# Patient Record
Sex: FEMALE | Race: BLACK OR AFRICAN AMERICAN | HISPANIC OR LATINO | Employment: PART TIME | URBAN - METROPOLITAN AREA
[De-identification: names, ages, dates, MRNs, and addresses within clinical notes are randomized per-mention and may not be internally consistent; named-entity substitution may affect disease eponyms.]

---

## 2019-09-10 PROBLEM — E66.811 CLASS 1 OBESITY DUE TO EXCESS CALORIES WITH SERIOUS COMORBIDITY AND BODY MASS INDEX (BMI) OF 32.0 TO 32.9 IN ADULT: Status: ACTIVE | Noted: 2019-09-10

## 2023-05-30 PROBLEM — E66.811 CLASS 1 OBESITY DUE TO EXCESS CALORIES WITH SERIOUS COMORBIDITY AND BODY MASS INDEX (BMI) OF 34.0 TO 34.9 IN ADULT: Status: RESOLVED | Noted: 2019-09-10 | Resolved: 2023-05-30

## 2024-02-06 ENCOUNTER — TELEPHONE (OUTPATIENT)
Dept: OPHTHALMOLOGY | Facility: CLINIC | Age: 64
End: 2024-02-06
Payer: COMMERCIAL

## 2024-02-06 NOTE — TELEPHONE ENCOUNTER
Spoke to pt and r/s appt for feb 26th    ----- Message from Mare Whalen OD sent at 2/6/2024 12:04 PM CST -----  Contact: patient  Offer feb 28 or 29  ----- Message -----  From: Rk Valencia  Sent: 2/6/2024  11:57 AM CST  To: Koby WALTON Staff    Type:  Patient Call          Who Called: patient         Does the patient know what this is regarding?: Requesting a call back to have her missed appt rescheduled ;please advise           Would the patient rather a call back or a response via [facility]?call           Best Call Back Number:[phone]             Additional Information:

## 2024-02-26 ENCOUNTER — OFFICE VISIT (OUTPATIENT)
Dept: OPTOMETRY | Facility: CLINIC | Age: 64
End: 2024-02-26
Payer: COMMERCIAL

## 2024-02-26 DIAGNOSIS — H04.123 DRY EYE SYNDROME, BILATERAL: ICD-10-CM

## 2024-02-26 DIAGNOSIS — H25.13 NS (NUCLEAR SCLEROSIS), BILATERAL: ICD-10-CM

## 2024-02-26 DIAGNOSIS — H52.4 PRESBYOPIA: ICD-10-CM

## 2024-02-26 DIAGNOSIS — E11.9 TYPE 2 DIABETES MELLITUS WITHOUT COMPLICATION, WITHOUT LONG-TERM CURRENT USE OF INSULIN: Primary | ICD-10-CM

## 2024-02-26 DIAGNOSIS — E11.9 TYPE 2 DIABETES MELLITUS WITHOUT OPHTHALMIC MANIFESTATIONS: ICD-10-CM

## 2024-02-26 PROCEDURE — 1159F MED LIST DOCD IN RCRD: CPT | Mod: CPTII,S$GLB,, | Performed by: OPTOMETRIST

## 2024-02-26 PROCEDURE — 92015 DETERMINE REFRACTIVE STATE: CPT | Mod: S$GLB,,, | Performed by: OPTOMETRIST

## 2024-02-26 PROCEDURE — 99204 OFFICE O/P NEW MOD 45 MIN: CPT | Mod: S$GLB,,, | Performed by: OPTOMETRIST

## 2024-02-26 PROCEDURE — 1160F RVW MEDS BY RX/DR IN RCRD: CPT | Mod: CPTII,S$GLB,, | Performed by: OPTOMETRIST

## 2024-02-26 PROCEDURE — 99999 PR PBB SHADOW E&M-EST. PATIENT-LVL III: CPT | Mod: PBBFAC,,, | Performed by: OPTOMETRIST

## 2024-02-26 PROCEDURE — 2023F DILAT RTA XM W/O RTNOPTHY: CPT | Mod: CPTII,S$GLB,, | Performed by: OPTOMETRIST

## 2024-02-26 RX ORDER — CYCLOSPORINE 0.5 MG/ML
1 EMULSION OPHTHALMIC 2 TIMES DAILY
Qty: 180 EACH | Refills: 3 | Status: SHIPPED | OUTPATIENT
Start: 2024-02-26 | End: 2025-02-25

## 2024-02-29 NOTE — PROGRESS NOTES
HPI    KARY: 4yrs    CC: Pt is here today for a routine eye exam. Pt states that her near and   distance vision is blurry. Pt states that her vision fluctuates.     (-)Dryness  (-)Burning  (+)Itchiness  (+)Tearing  (-)Flashes  (+)Floaters   (+)Photophobia  (-)Eye Pain  (+) Migraines       Diabetic: yes  A1C: 5.4 on 9/19/2023    Contact Lens: no    Eye Meds: no  Last edited by Samira Quijano MA on 2/26/2024  3:38 PM.            Assessment /Plan     For exam results, see Encounter Report.          Type 2 diabetes mellitus without complication, without long-term current use of insulin  Type 2 diabetes mellitus without ophthalmic manifestations  Hypertension, unspecified type              No retinopathy, monitor yearly       NS (nuclear sclerosis), bilateral              Mild, monitor yearly          Dry eye syndrome, bilateral  -     cycloSPORINE (RESTASIS) 0.05 % ophthalmic emulsion; Place 1 drop into both eyes 2 (two) times daily.  Dispense: 180 each; Refill: 12  -         Presbyopia    Rx specs, discussed trifocal vs computer reading specs    RTC 1 year, sooner PRN

## 2024-03-08 ENCOUNTER — OFFICE VISIT (OUTPATIENT)
Dept: ALLERGY | Facility: CLINIC | Age: 64
End: 2024-03-08
Payer: COMMERCIAL

## 2024-03-08 VITALS — WEIGHT: 184.5 LBS | HEIGHT: 66 IN | BODY MASS INDEX: 29.65 KG/M2

## 2024-03-08 DIAGNOSIS — H10.13 ALLERGIC CONJUNCTIVITIS, BILATERAL: Primary | ICD-10-CM

## 2024-03-08 DIAGNOSIS — J45.20 MILD INTERMITTENT ASTHMA WITHOUT COMPLICATION: ICD-10-CM

## 2024-03-08 DIAGNOSIS — J30.9 CHRONIC ALLERGIC RHINITIS: ICD-10-CM

## 2024-03-08 DIAGNOSIS — L29.9 PRURITUS: ICD-10-CM

## 2024-03-08 DIAGNOSIS — T78.3XXD ANGIOEDEMA, SUBSEQUENT ENCOUNTER: ICD-10-CM

## 2024-03-08 PROCEDURE — 1159F MED LIST DOCD IN RCRD: CPT | Mod: CPTII,S$GLB,, | Performed by: ALLERGY & IMMUNOLOGY

## 2024-03-08 PROCEDURE — 99215 OFFICE O/P EST HI 40 MIN: CPT | Mod: S$GLB,,, | Performed by: ALLERGY & IMMUNOLOGY

## 2024-03-08 PROCEDURE — 99999 PR PBB SHADOW E&M-EST. PATIENT-LVL III: CPT | Mod: PBBFAC,,, | Performed by: ALLERGY & IMMUNOLOGY

## 2024-03-08 PROCEDURE — 3008F BODY MASS INDEX DOCD: CPT | Mod: CPTII,S$GLB,, | Performed by: ALLERGY & IMMUNOLOGY

## 2024-03-08 PROCEDURE — 1160F RVW MEDS BY RX/DR IN RCRD: CPT | Mod: CPTII,S$GLB,, | Performed by: ALLERGY & IMMUNOLOGY

## 2024-03-08 RX ORDER — MONTELUKAST SODIUM 10 MG/1
10 TABLET ORAL NIGHTLY
Qty: 30 TABLET | Refills: 12 | Status: SHIPPED | OUTPATIENT
Start: 2024-03-08 | End: 2024-04-07

## 2024-03-08 RX ORDER — ONDANSETRON 4 MG/1
4 TABLET, ORALLY DISINTEGRATING ORAL
COMMUNITY
Start: 2023-11-14

## 2024-03-08 NOTE — PROGRESS NOTES
Subjective:       Patient ID: Meka Medina is a 63 y.o. female.    Chief Complaint:  Follow-up and Asthma (Chronic allergic rhinitis)      64 yo woman presents for continued evaluation of allergic rhinitis and conjunctivitis, asthma and face swelling and itching. She was last seen 7/22/2021. She had labs with positive immunocaps to cat, cockroach, grass, trees and weeds. Also had low positive to peanut but rest negative. had normal C1 esterase, thyroid and SPEP but IgE receptor antibody not back. She has had no more swelling.   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 Strong scents are triggering HA< nausea and SOB. She has had to stay off work because of this. When home she is fine, is only triggered around strong scents. She has paper work to request fragrance free. She is taking azelastine 2 SEN BID and albuterol about 3 ties per day. She does have runny nose but not much sneeze. Has some congestion. More tight chest and SOB. She does get some itchy skin as well at times.   .     Prior History taken 1/22/2021: new patient evaluation of face swelling. She states for at least 6 months she gets eye, nose and lip swelling. Off and on. No SOB. The skin will feel tight but no hives or itch. She also has rhinitis with congestion.PND, itchy nose and skin. Seems worse with scents like form nail salon, dog, curtains, pine pollen. Also has diarrhea with tomato products and rash wit latex and banana so avoids those. She takes benadryl prn for swelling. Tried xyzal but made too sleepy. She has asthma but rarely uses albuterol. No eczema. Has HTN and DM        Environmental History: see history section for home environment  Review of Systems   Constitutional:  Negative for appetite change, chills, fatigue and fever.   HENT:  Positive for congestion, postnasal drip and rhinorrhea. Negative for ear discharge, ear pain, facial swelling, nosebleeds, sinus pressure, sneezing, sore throat, trouble  swallowing and voice change.    Eyes:  Negative for discharge, redness, itching and visual disturbance.   Respiratory:  Positive for cough, chest tightness and shortness of breath. Negative for choking and wheezing.    Cardiovascular:  Negative for chest pain, palpitations and leg swelling.   Gastrointestinal:  Negative for abdominal distention, abdominal pain, constipation, diarrhea, nausea and vomiting.   Genitourinary:  Negative for difficulty urinating.   Musculoskeletal:  Negative for arthralgias, gait problem, joint swelling and myalgias.   Skin:  Negative for color change and rash.   Neurological:  Positive for headaches. Negative for dizziness, syncope, weakness and light-headedness.   Hematological:  Negative for adenopathy. Does not bruise/bleed easily.   Psychiatric/Behavioral:  Negative for agitation, behavioral problems, confusion and sleep disturbance. The patient is not nervous/anxious.         Objective:      Physical Exam  Vitals and nursing note reviewed.   Constitutional:       General: She is not in acute distress.     Appearance: Normal appearance. She is not ill-appearing.   HENT:      Nose: No rhinorrhea.   Eyes:      General:         Right eye: No discharge.         Left eye: No discharge.      Conjunctiva/sclera: Conjunctivae normal.   Pulmonary:      Effort: Pulmonary effort is normal. No respiratory distress.   Abdominal:      General: There is no distension.   Skin:     General: Skin is warm and dry.      Findings: No erythema or rash.   Neurological:      Mental Status: She is alert and oriented to person, place, and time.   Psychiatric:         Mood and Affect: Mood normal.         Behavior: Behavior normal.         Laboratory:   none performed   Assessment:       1. Allergic conjunctivitis, bilateral    2. Mild intermittent asthma without complication    3. Chronic allergic rhinitis    4. Angioedema, subsequent encounter    5. Pruritus         Plan:       1. Pt with RAD/asthma and is  triggered by scents, viruses, allergens. Discussed in detail with pt and forms filled out for work to have fragrance free environment  2. Continue azelastine 2 SEN BID and add montelukast 10 mg daily  3. Albuterol 2 puff every 4 hours, advised ideal is to use less then 2 times per week so if not better with montelukast then may need ICS  4. Cat avoidance  5. She does not eat peanuts so not cause of face swelling. She will continue to avoid as does not like  6. RTC 6 months or sooner if not controlled    I spent a total of 40 minutes on the day of the visit.  This includes face to face time and non-face to face time preparing to see the patient (eg, review of tests), obtaining and/or reviewing separately obtained history, documenting clinical information in the electronic or other health record, independently interpreting results and communicating results to the patient/family/caregiver, or care coordinator.

## 2024-03-15 ENCOUNTER — TELEPHONE (OUTPATIENT)
Dept: ALLERGY | Facility: CLINIC | Age: 64
End: 2024-03-15
Payer: COMMERCIAL

## 2024-03-15 NOTE — TELEPHONE ENCOUNTER
----- Message from Anisa Moya MD sent at 3/15/2024  8:48 AM CDT -----  Contact: [phone]  Please advise pt I am in clinic and cannot make phone calls,. She can send message through protal or give information to be relayed here. If she has paperwork can schedule a follow up to be filled out Or drop off in clinic but need 72 hours to complete  ----- Message -----  From: Neyda Layton MA  Sent: 3/15/2024   8:34 AM CDT  To: Anisa Moya MD      ----- Message -----  From: Joselyn Kothari  Sent: 3/15/2024   8:03 AM CDT  To: [facility] Staff    Patient called, requested a courtesy call from Dr Moya in regards rx montelukast (SINGULAIR) 10 mg tablet in regards side effects and paper work that she needs to bring for doctor to fill it out. Please call and advise. Thank you.

## 2024-03-21 ENCOUNTER — TELEPHONE (OUTPATIENT)
Dept: ALLERGY | Facility: CLINIC | Age: 64
End: 2024-03-21
Payer: COMMERCIAL

## 2024-03-21 NOTE — TELEPHONE ENCOUNTER
Spoke with patient in regards to below message. She verbalized she does not want to decrease the dose of singulair. And she is feeling much better.      Singulair does not cause sedation so her symptoms may be related to something else but we can decrease to the children's dose of 5 mg but that low dose may not be effective

## 2024-03-21 NOTE — TELEPHONE ENCOUNTER
----- Message from Anisa Moya MD sent at 3/21/2024 10:45 AM CDT -----  Singulair does not cause sedation so her symptoms may be related to something else but we can decrease to the children's dose of 5 mg but that low dose may not be effective  ----- Message -----  From: Donna Price MA  Sent: 3/21/2024  10:13 AM CDT  To: Anisa Moya MD    Please advise    Spoke to patient in regards to below message. Patient verbalized she is feeling drowsy an not able to function on the 10mg dose of Singulair is it a lesser dose she can take to be able to do ADL.       ----- Message -----  From: Maile Sanchez  Sent: 3/21/2024  10:01 AM CDT  To: [facility] Staff    Type:  Needs Medical Advice    Who Called: pt   Best Call Back Number: [phone] (home)     Additional Information:  Requesting call back  wants to know should she have a f/u or a change of rx     please advise thank you

## 2024-06-12 ENCOUNTER — TELEPHONE (OUTPATIENT)
Dept: ALLERGY | Facility: CLINIC | Age: 64
End: 2024-06-12
Payer: COMMERCIAL

## 2024-06-12 NOTE — TELEPHONE ENCOUNTER
Spoke with MsLorin Carol concerning the steroid challenge. Ms. Vicente states that she will be  having a medical procedure done that will consist of steroids being injected into her spine. She also states that years ago she was given steroids  which cause her to be hospitalized for muscle weakness and feeling jittery. She would like to get an allergy test for steroids.

## 2024-06-13 NOTE — TELEPHONE ENCOUNTER
Spoke with patient today  and informed her of message below. Patient request for appointment to get educated on steroid side effects. Appointment has been scheduled for June 21 st with Dr. Moya.                6/13/24  7:59 AM  The symptoms she is describing are side effects from steroid and not allergy symptoms so testing does not sound warranted. She can make an appointment to review the story of reaction and advise her more  June 12, 2024  Me   to Anisa Moya MD   CG      6/12/24  4:58 PM  Spoke with Ms. Medina concerning the steroid challenge. Ms. Medina states that she will be  having a medical procedure done that will consist of steroids being injected into her spine. She also states that years ago she was given steroids  which cause her to be hospitalized for muscle weakness and feeling jittery. She would like to get an allergy test for steroids.

## 2024-06-21 ENCOUNTER — OFFICE VISIT (OUTPATIENT)
Dept: ALLERGY | Facility: CLINIC | Age: 64
End: 2024-06-21
Payer: COMMERCIAL

## 2024-06-21 VITALS — WEIGHT: 195.56 LBS | BODY MASS INDEX: 31.43 KG/M2 | HEIGHT: 66 IN

## 2024-06-21 DIAGNOSIS — L29.9 PRURITUS: ICD-10-CM

## 2024-06-21 DIAGNOSIS — H10.13 ALLERGIC CONJUNCTIVITIS, BILATERAL: Primary | ICD-10-CM

## 2024-06-21 DIAGNOSIS — Z88.9 DRUG ALLERGY: ICD-10-CM

## 2024-06-21 DIAGNOSIS — J30.9 CHRONIC ALLERGIC RHINITIS: ICD-10-CM

## 2024-06-21 DIAGNOSIS — J45.20 MILD INTERMITTENT ASTHMA WITHOUT COMPLICATION: ICD-10-CM

## 2024-06-21 DIAGNOSIS — T78.3XXD ANGIOEDEMA, SUBSEQUENT ENCOUNTER: ICD-10-CM

## 2024-06-21 PROCEDURE — 99999 PR PBB SHADOW E&M-EST. PATIENT-LVL III: CPT | Mod: PBBFAC,,, | Performed by: ALLERGY & IMMUNOLOGY

## 2024-06-21 NOTE — PROGRESS NOTES
Subjective:       Patient ID: Meka Medina is a 63 y.o. female.    Chief Complaint:  No chief complaint on file.      64 yo woman presents for continued evaluation of allergic rhinitis and conjunctivitis, asthma and face swelling and itching. She was last seen 3/8/2024. She had labs with positive immunocaps to cat, cockroach, grass, trees and weeds. Also had low positive to peanut but rest negative. had normal C1 esterase, thyroid and SPEP but IgE receptor antibody not back. She has had no more swelling.   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 Strong scents are triggering HA, nausea and SOB. She has had to stay off work because of this. When home she is fine, is only triggered around strong scents. She has paper work to request fragrance free. This was done and she did much better at end of school year and is going to different class next year. She is taking azelastine 2 SEN BID and albuterol prn. She does have runny nose but not much sneeze. Has some congestion.   She has H/O reaction to steroid in past. Was on oral steroid and had legs collapse on her. Ended up in hospital for weakness. No rash, no swelling, no SOB. She has had small amount of prednisone for few days in past but other times prescribed and did not take.   .     Prior History taken 1/22/2021: new patient evaluation of face swelling. She states for at least 6 months she gets eye, nose and lip swelling. Off and on. No SOB. The skin will feel tight but no hives or itch. She also has rhinitis with congestion.PND, itchy nose and skin. Seems worse with scents like form nail salon, dog, curtains, pine pollen. Also has diarrhea with tomato products and rash wit latex and banana so avoids those. She takes benadryl prn for swelling. Tried xyzal but made too sleepy. She has asthma but rarely uses albuterol. No eczema. Has HTN and DM        Environmental History: see history section for home environment  Review of Systems    Constitutional:  Negative for appetite change, chills, fatigue and fever.   HENT:  Positive for congestion, postnasal drip and rhinorrhea. Negative for ear discharge, ear pain, facial swelling, nosebleeds, sinus pressure, sneezing, sore throat, trouble swallowing and voice change.    Eyes:  Negative for discharge, redness, itching and visual disturbance.   Respiratory:  Positive for cough, chest tightness and shortness of breath. Negative for choking and wheezing.    Cardiovascular:  Negative for chest pain, palpitations and leg swelling.   Gastrointestinal:  Negative for abdominal distention, abdominal pain, constipation, diarrhea, nausea and vomiting.   Genitourinary:  Negative for difficulty urinating.   Musculoskeletal:  Negative for arthralgias, gait problem, joint swelling and myalgias.   Skin:  Negative for color change and rash.   Neurological:  Positive for headaches. Negative for dizziness, syncope, weakness and light-headedness.   Hematological:  Negative for adenopathy. Does not bruise/bleed easily.   Psychiatric/Behavioral:  Negative for agitation, behavioral problems, confusion and sleep disturbance. The patient is not nervous/anxious.         Objective:      Physical Exam  Vitals and nursing note reviewed.   Constitutional:       General: She is not in acute distress.     Appearance: Normal appearance. She is not ill-appearing.   HENT:      Nose: No rhinorrhea.   Eyes:      General:         Right eye: No discharge.         Left eye: No discharge.      Conjunctiva/sclera: Conjunctivae normal.   Pulmonary:      Effort: Pulmonary effort is normal. No respiratory distress.   Abdominal:      General: There is no distension.   Skin:     General: Skin is warm and dry.      Findings: No erythema or rash.   Neurological:      Mental Status: She is alert and oriented to person, place, and time.   Psychiatric:         Mood and Affect: Mood normal.         Behavior: Behavior normal.       Laboratory:   none  performed   Assessment:       1. Allergic conjunctivitis, bilateral    2. Mild intermittent asthma without complication    3. Chronic allergic rhinitis    4. Angioedema, subsequent encounter    5. Pruritus         Plan:       1. Pt with RAD/asthma and is triggered by scents, viruses, allergens. Discussed in detail with pt and forms filled out for work to have fragrance free environment  2. Continue azelastine 2 SEN BID and add montelukast 10 mg daily  3. Albuterol 2 puff every 4 hours, advised ideal is to use less then 2 times per week so if not better with montelukast then may need ICS  4. Cat avoidance  5. She does not eat peanuts so not cause of face swelling. She will continue to avoid as does not like  6. Advised weakness with steroid is side effect and not allergy. There is not esting that can be done. Given plan is steroid through epidural less chance of systemic side effects wit epidural, advised pt to discuss with kyrie who will do epidiral  7. RTC 6 months or sooner if not controlled    I spent a total of 40 minutes on the day of the visit.  This includes face to face time and non-face to face time preparing to see the patient (eg, review of tests), obtaining and/or reviewing separately obtained history, documenting clinical information in the electronic or other health record, independently interpreting results and communicating results to the patient/family/caregiver, or care coordinator.

## 2024-09-16 ENCOUNTER — OFFICE VISIT (OUTPATIENT)
Dept: URGENT CARE | Facility: CLINIC | Age: 64
End: 2024-09-16
Payer: COMMERCIAL

## 2024-09-16 VITALS
OXYGEN SATURATION: 99 % | HEIGHT: 66 IN | BODY MASS INDEX: 31.43 KG/M2 | RESPIRATION RATE: 15 BRPM | DIASTOLIC BLOOD PRESSURE: 78 MMHG | TEMPERATURE: 98 F | WEIGHT: 195.56 LBS | HEART RATE: 72 BPM | SYSTOLIC BLOOD PRESSURE: 135 MMHG

## 2024-09-16 DIAGNOSIS — R53.83 FATIGUE, UNSPECIFIED TYPE: ICD-10-CM

## 2024-09-16 DIAGNOSIS — R06.02 SHORTNESS OF BREATH: Primary | ICD-10-CM

## 2024-09-16 DIAGNOSIS — F41.9 ANXIETY: ICD-10-CM

## 2024-09-16 DIAGNOSIS — R07.89 CHEST DISCOMFORT: ICD-10-CM

## 2024-09-16 LAB
BILIRUBIN, UA POC OHS: NEGATIVE
BLOOD, UA POC OHS: NEGATIVE
CLARITY, UA POC OHS: CLEAR
COLOR, UA POC OHS: YELLOW
CTP QC/QA: YES
GLUCOSE, UA POC OHS: NEGATIVE
KETONES, UA POC OHS: NEGATIVE
LEUKOCYTES, UA POC OHS: NEGATIVE
NITRITE, UA POC OHS: NEGATIVE
PH, UA POC OHS: 5.5
PROTEIN, UA POC OHS: NEGATIVE
SARS-COV-2 AG RESP QL IA.RAPID: NEGATIVE
SPECIFIC GRAVITY, UA POC OHS: 1.02
UROBILINOGEN, UA POC OHS: 0.2

## 2024-09-16 PROCEDURE — 81003 URINALYSIS AUTO W/O SCOPE: CPT | Mod: QW,S$GLB,,

## 2024-09-16 PROCEDURE — 71046 X-RAY EXAM CHEST 2 VIEWS: CPT | Mod: S$GLB,,, | Performed by: RADIOLOGY

## 2024-09-16 PROCEDURE — 93010 ELECTROCARDIOGRAM REPORT: CPT | Mod: S$GLB,,, | Performed by: INTERNAL MEDICINE

## 2024-09-16 PROCEDURE — 99214 OFFICE O/P EST MOD 30 MIN: CPT | Mod: S$GLB,,,

## 2024-09-16 PROCEDURE — 93005 ELECTROCARDIOGRAM TRACING: CPT | Mod: S$GLB,,,

## 2024-09-16 PROCEDURE — 87811 SARS-COV-2 COVID19 W/OPTIC: CPT | Mod: QW,S$GLB,,

## 2024-09-16 NOTE — PROGRESS NOTES
"Subjective:      Patient ID: Meka Medina is a 63 y.o. female.    Vitals:  height is 5' 6" (1.676 m) and weight is 88.7 kg (195 lb 8.8 oz). Her oral temperature is 98.3 °F (36.8 °C). Her blood pressure is 135/78 and her pulse is 72. Her respiration is 15 and oxygen saturation is 99%.     Chief Complaint: Breathing Problem    63 year old female c/o rapid breathing. Pt states Friday she had an procedure done and an (epidural). Pt states this morning she wasn't feeling good. Pt states weakness, fatigue, drowsy, and burning up(but no fever). Pt states she doesn't feel like herself, anxiety and anxious. Pt states shortness of breath and chest tightness. Pt states she had to stop walking to gasp for air. Pt states she used the albuterol inhaler this morning.  Pt states headaches. She wonders if the anesthesia have something to do with it. Pt states the trouble swallowing subsided this morning. Pt states that she feel very foggy.     Breathing Problem  She complains of chest tightness, difficulty breathing, hoarse voice and shortness of breath. There is no cough or wheezing. This is a new problem. The current episode started in the past 7 days. The problem occurs constantly. The problem has been gradually worsening. Associated symptoms include dyspnea on exertion, headaches and trouble swallowing. Pertinent negatives include no chest pain, fever, myalgias or nasal congestion. Her symptoms are aggravated by any activity.       Constitution: Negative for fever.   HENT:  Positive for trouble swallowing.    Cardiovascular:  Positive for sob on exertion. Negative for chest pain.   Respiratory:  Positive for shortness of breath. Negative for cough and wheezing.    Gastrointestinal:  Negative for abdominal pain, nausea, vomiting and diarrhea.   Musculoskeletal:  Negative for muscle ache.   Neurological:  Positive for headaches.      Objective:     Physical Exam   Constitutional: She is oriented to person, place, and time. She " appears well-developed. She is cooperative.  Non-toxic appearance. She does not appear ill. No distress.   HENT:   Head: Normocephalic and atraumatic.   Ears:   Right Ear: Hearing, tympanic membrane, external ear and ear canal normal.   Left Ear: Hearing, tympanic membrane, external ear and ear canal normal.   Nose: Nose normal. No mucosal edema, rhinorrhea or nasal deformity. No epistaxis. Right sinus exhibits no maxillary sinus tenderness and no frontal sinus tenderness. Left sinus exhibits no maxillary sinus tenderness and no frontal sinus tenderness.   Mouth/Throat: Uvula is midline, oropharynx is clear and moist and mucous membranes are normal. No trismus in the jaw. Normal dentition. No uvula swelling. No posterior oropharyngeal erythema.   Eyes: Conjunctivae and lids are normal. Right eye exhibits no discharge. Left eye exhibits no discharge. No scleral icterus.   Neck: Trachea normal and phonation normal. Neck supple.   Cardiovascular: Normal rate, regular rhythm, normal heart sounds and normal pulses.   Pulmonary/Chest: Effort normal and breath sounds normal. No stridor. No respiratory distress. She has no wheezes. She has no rhonchi. She has no rales.   Abdominal: Normal appearance and bowel sounds are normal. She exhibits no distension and no mass. Soft. There is no abdominal tenderness.   Musculoskeletal: Normal range of motion.         General: No deformity. Normal range of motion.   Neurological: She is alert and oriented to person, place, and time. She exhibits normal muscle tone. Coordination normal.   Skin: Skin is warm, dry, intact, not diaphoretic and not pale.   Psychiatric: Her speech is normal and behavior is normal. Judgment and thought content normal.   Nursing note and vitals reviewed.      Assessment:     1. Shortness of breath    2. Chest discomfort    3. Fatigue, unspecified type    4. Anxiety        Plan:     Results for orders placed or performed in visit on 09/16/24   SARS Coronavirus  2 Antigen, POCT Manual Read   Result Value Ref Range    SARS Coronavirus 2 Antigen Negative Negative     Acceptable Yes    POCT Urinalysis(Instrument)   Result Value Ref Range    Color, POC UA Yellow Yellow, Straw, Colorless    Clarity, POC UA Clear Clear    Glucose, POC UA Negative Negative    Bilirubin, POC UA Negative Negative    Ketones, POC UA Negative Negative    Spec Grav POC UA 1.020 1.005 - 1.030    Blood, POC UA Negative Negative    pH, POC UA 5.5 5.0 - 8.0    Protein, POC UA Negative Negative    Urobilinogen, POC UA 0.2 <=1.0    Nitrite, POC UA Negative Negative    WBC, POC UA Negative Negative     EXAMINATION:  XR CHEST PA AND LATERAL     CLINICAL HISTORY:  Shortness of breath     TECHNIQUE:  PA and lateral views of the chest were performed.     COMPARISON:  09/19/2023     FINDINGS:  The cardiomediastinal silhouette is not enlarged, magnified by technique noting calcification of the aorta..  There is no pleural effusion.  The trachea is midline.  The lungs are symmetrically expanded bilaterally without evidence of acute parenchymal process. No large focal consolidation seen.  There is no pneumothorax.  The osseous structures are remarkable for degenerative change..     Impression:     1. No acute cardiopulmonary process.        Electronically signed by:Delvin Rubio MD  Date:                                            09/16/2024  Time:                                           16:47      Shortness of breath  -     XR CHEST PA AND LATERAL; Future; Expected date: 09/16/2024    Chest discomfort  -     IN OFFICE EKG 12-LEAD (to Muse)    Fatigue, unspecified type  -     SARS Coronavirus 2 Antigen, POCT Manual Read  -     POCT Urinalysis(Instrument)    Anxiety      Medical Decision Making:   Differential Diagnosis:   Pneumonia, viral URI, anxiety, PE, UTI, ACS, adverse effect of anesthesia   Urgent Care Management:  62 y/o female here with c/o SOB, chest discomfort, anxiety, headache, fatigue,  subjective fevers, sore throat, foot pain. Tried her albuterol inhaler without relief. She had L4 & L5 epidural procedure before symptoms began on 9/13/2024. Did not receive full anesthesia. She tried to contact the surgeon but waiting on them to call her back. She has a lot of anxiety about going back to her job and would like an excuse. She's having trouble sleeping at night. She is upset because she didn't want to have this procedure done but did it so she can continue to work. EKG NSR, no ectopy or ST abnormality. Chest x-ray negative. UA negative. COVID negative. Walking saturation 98-99% with HR 72 bpm. Advised to rest at home. Increase water intake. Manage stress levels. Low sodium diet. Tylenol for pain. Call PCP and surgeon for recheck in a few days. Strict ED precautions if symptoms worsen.     30 minutes spent on patient's encounter. This includes face to face time and non-face to face time preparing to see the patient (eg, review of tests), obtaining and/or reviewing separately obtained history, documenting clinical information in the electronic or other health record, independently interpreting results and communicating results to the patient/family/caregiver, or care coordinator.    Discussed results/diagnosis/plan with patient in clinic. Strict precautions given to patient to monitor for worsening signs and symptoms. Advised to follow up with PCP or specialist.  Explained side effects of medications prescribed with patient and informed him/her to discontinue use if he/she has any side effects and to inform UC or PCP if this occurs. All questions answered. Strict ED verses clinic return precautions stressed and given in depth. Advised if symptoms worsens of fail to improve he/she should go to the Emergency Room. Discharge and follow-up instructions given verbally/printed with the patient who expressed understanding and willingness to comply with my recommendations. Patient voiced understanding and in  agreement with current treatment plan. Patient exits the exam room in no acute distress. Conversant and engaged during discharge discussion, verbalized understanding.

## 2024-09-16 NOTE — LETTER
September 16, 2024      [facility] [address] [address]  Phone: [phone]  Fax: [phone]       Patient: Meka Medina   YOB: 1960  Date of Visit: 09/16/2024    To Whom It May Concern:    Isaac Medina  was at [facility] on 09/16/2024. The patient may return to work/school on 9/18/2024 or 9/19/2024 with no restrictions. If you have any questions or concerns, or if I can be of further assistance, please do not hesitate to contact me.    Sincerely,    Ester Bonilla, NP

## 2024-09-16 NOTE — PATIENT INSTRUCTIONS
Drink plenty of water. The recommended daily fluid intake for women is 2.7 liters (five 16 oz bottles).      Rest at home. Get good rest at night. Eat nutritious foods. Low sodium diet. Take tylenol for pain.     Call your surgeon for recheck in a few days.     Go to the ER if you have chest pain, shortness of breath, palpitations, worsening anxiety, abdominal pain, vomiting and unable to tolerate fluids.

## 2024-09-17 ENCOUNTER — TELEPHONE (OUTPATIENT)
Dept: URGENT CARE | Facility: CLINIC | Age: 64
End: 2024-09-17
Payer: COMMERCIAL

## 2024-09-17 ENCOUNTER — HOSPITAL ENCOUNTER (EMERGENCY)
Facility: HOSPITAL | Age: 64
Discharge: HOME | End: 2024-09-17
Attending: EMERGENCY MEDICINE
Payer: COMMERCIAL

## 2024-09-17 VITALS
SYSTOLIC BLOOD PRESSURE: 165 MMHG | HEIGHT: 66 IN | HEART RATE: 65 BPM | RESPIRATION RATE: 16 BRPM | DIASTOLIC BLOOD PRESSURE: 92 MMHG | BODY MASS INDEX: 30.53 KG/M2 | TEMPERATURE: 99 F | OXYGEN SATURATION: 95 % | WEIGHT: 190 LBS

## 2024-09-17 DIAGNOSIS — R94.31 ABNORMAL EKG: Primary | ICD-10-CM

## 2024-09-17 DIAGNOSIS — R68.89 MULTIPLE COMPLAINTS: ICD-10-CM

## 2024-09-17 DIAGNOSIS — R07.9 CHEST PAIN: ICD-10-CM

## 2024-09-17 LAB
ALBUMIN SERPL BCP-MCNC: 3.6 G/DL (ref 3.5–5.2)
ALP SERPL-CCNC: 122 U/L (ref 55–135)
ALT SERPL W/O P-5'-P-CCNC: 16 U/L (ref 10–44)
ANION GAP SERPL CALC-SCNC: 9 MMOL/L (ref 8–16)
AST SERPL-CCNC: 16 U/L (ref 10–40)
BASOPHILS # BLD AUTO: 0.04 K/UL (ref 0–0.2)
BASOPHILS NFR BLD: 0.4 % (ref 0–1.9)
BILIRUB SERPL-MCNC: 0.3 MG/DL (ref 0.1–1)
BUN SERPL-MCNC: 20 MG/DL (ref 8–23)
CALCIUM SERPL-MCNC: 9.5 MG/DL (ref 8.7–10.5)
CHLORIDE SERPL-SCNC: 106 MMOL/L (ref 95–110)
CO2 SERPL-SCNC: 29 MMOL/L (ref 23–29)
CREAT SERPL-MCNC: 0.8 MG/DL (ref 0.5–1.4)
DIFFERENTIAL METHOD BLD: ABNORMAL
EOSINOPHIL # BLD AUTO: 0.1 K/UL (ref 0–0.5)
EOSINOPHIL NFR BLD: 0.6 % (ref 0–8)
ERYTHROCYTE [DISTWIDTH] IN BLOOD BY AUTOMATED COUNT: 18.6 % (ref 11.5–14.5)
EST. GFR  (NO RACE VARIABLE): >60 ML/MIN/1.73 M^2
GLUCOSE SERPL-MCNC: 104 MG/DL (ref 70–110)
HCT VFR BLD AUTO: 43.2 % (ref 37–48.5)
HCV AB SERPL QL IA: NORMAL
HGB BLD-MCNC: 12.4 G/DL (ref 12–16)
HIV 1+2 AB+HIV1 P24 AG SERPL QL IA: NORMAL
IMM GRANULOCYTES # BLD AUTO: 0.02 K/UL (ref 0–0.04)
IMM GRANULOCYTES NFR BLD AUTO: 0.2 % (ref 0–0.5)
LYMPHOCYTES # BLD AUTO: 2.7 K/UL (ref 1–4.8)
LYMPHOCYTES NFR BLD: 27.6 % (ref 18–48)
MCH RBC QN AUTO: 21.3 PG (ref 27–31)
MCHC RBC AUTO-ENTMCNC: 28.7 G/DL (ref 32–36)
MCV RBC AUTO: 74 FL (ref 82–98)
MONOCYTES # BLD AUTO: 0.5 K/UL (ref 0.3–1)
MONOCYTES NFR BLD: 5.2 % (ref 4–15)
NEUTROPHILS # BLD AUTO: 6.5 K/UL (ref 1.8–7.7)
NEUTROPHILS NFR BLD: 66 % (ref 38–73)
NRBC BLD-RTO: 0 /100 WBC
OHS QRS DURATION: 88 MS
OHS QRS DURATION: 94 MS
OHS QTC CALCULATION: 408 MS
OHS QTC CALCULATION: 424 MS
PLATELET # BLD AUTO: 288 K/UL (ref 150–450)
PMV BLD AUTO: 9 FL (ref 9.2–12.9)
POTASSIUM SERPL-SCNC: 3.9 MMOL/L (ref 3.5–5.1)
PROT SERPL-MCNC: 7.1 G/DL (ref 6–8.4)
RBC # BLD AUTO: 5.81 M/UL (ref 4–5.4)
SODIUM SERPL-SCNC: 144 MMOL/L (ref 136–145)
TROPONIN I SERPL DL<=0.01 NG/ML-MCNC: <0.006 NG/ML (ref 0–0.03)
TROPONIN I SERPL DL<=0.01 NG/ML-MCNC: <0.006 NG/ML (ref 0–0.03)
WBC # BLD AUTO: 9.8 K/UL (ref 3.9–12.7)

## 2024-09-17 PROCEDURE — 84484 ASSAY OF TROPONIN QUANT: CPT | Performed by: PHYSICIAN ASSISTANT

## 2024-09-17 PROCEDURE — 93010 ELECTROCARDIOGRAM REPORT: CPT | Mod: ,,, | Performed by: INTERNAL MEDICINE

## 2024-09-17 PROCEDURE — 99285 EMERGENCY DEPT VISIT HI MDM: CPT | Mod: 25

## 2024-09-17 PROCEDURE — 93005 ELECTROCARDIOGRAM TRACING: CPT

## 2024-09-17 PROCEDURE — 85025 COMPLETE CBC W/AUTO DIFF WBC: CPT | Performed by: PHYSICIAN ASSISTANT

## 2024-09-17 PROCEDURE — 87389 HIV-1 AG W/HIV-1&-2 AB AG IA: CPT | Performed by: PHYSICIAN ASSISTANT

## 2024-09-17 PROCEDURE — 80053 COMPREHEN METABOLIC PANEL: CPT | Performed by: PHYSICIAN ASSISTANT

## 2024-09-17 PROCEDURE — 86803 HEPATITIS C AB TEST: CPT | Performed by: PHYSICIAN ASSISTANT

## 2024-09-17 NOTE — TELEPHONE ENCOUNTER
Pt called and informed of EKG findings some TW abnormalities lateral leads, new from previous in our EMR. Another provider had seen her in clinic yesterday w c/o chest tightness, sob, cxr nml, covid and flu neg.   Pt states she still feels same. Advised pt to go to ER for further evaluation of new EKG TW changes and continued symptoms including chest tightness and sob. She states she feels generalized weakness

## 2024-09-17 NOTE — DISCHARGE INSTRUCTIONS
Your cardiac workup today was normal.  The changes on your EKG are no longer present today.  Please follow-up with your primary care doctor.  If you develop any new or worsening symptoms please return to ED sooner.

## 2024-09-17 NOTE — ED NOTES
Patient identifiers verified and correct for  Ms Medina  C/C: SOB, EKG changes SEE NN  APPEARANCE: awake and alert in NAD. PAIN  0/10  SKIN: warm, dry and intact. No breakdown or bruising.  MUSCULOSKELETAL: Patient moving all extremities spontaneously, no obvious swelling or deformities noted. Ambulates independently.  RESPIRATORY: Positive  shortness of breath.Respirations unlabored.   CARDIAC: Denies CP, 2+ distal pulses; no peripheral edema  ABDOMEN: S/ND/NT, Denies nausea  : voids spontaneously, denies difficulty  Neurologic: AAO x 4; follows commands equal strength in all extremities; denies numbness/tingling. Denies dizziness Denies new weakness,

## 2024-09-17 NOTE — ED NOTES
Patient states dizziness, headache and SOB, reports UC told her to come to ED for EKG changes yesterday

## 2024-09-17 NOTE — ED PROVIDER NOTES
Encounter Date: 9/17/2024       History     Chief Complaint   Patient presents with    Multiple complaints     Seen at  yest, called me back to get a better EKG reading, still feeling sob     63-year-old female with past medical history of hypertension, TIA, asthma, diabetes, anxiety presents to the ED for abnormal EKG.  Patient states she was anxiety has episodes of rapid breathing and chest tightness.  Was seen at urgent care yesterday for this and the noted abnormality in the lateral leads and patient was called back after being discharge and recommended go to ED further evaluation.  Patient currently denies any chest pain or shortness of breath.        Review of patient's allergies indicates:   Allergen Reactions    Iodine and iodide containing products Other (See Comments)     Pt had nausea, vomiting and seizure like activity.    Latex, natural rubber Rash    Penicillins Shortness Of Breath    Tomato Nausea And Vomiting    Tomato (solanum lycopersicum) Nausea And Vomiting    Vicodin [hydrocodone-acetaminophen] Nausea And Vomiting    Bananas [banana] Rash    Mcneil Nausea Only     magnolias     Past Medical History:   Diagnosis Date    Allergy     Anemia     Asthma     Depression     Diabetes mellitus     Diverticulosis     GERD (gastroesophageal reflux disease)     Headache disorder     pt states due to cervical spondylosis    History of positive PPD     Hypertension     Intestinal metaplasia of gastric mucosa     Obesity     Spondylosis of cervical region without myelopathy or radiculopathy 12/13/2016    TIA (transient ischemic attack)     questionable history of    Vitamin D deficiency      Past Surgical History:   Procedure Laterality Date    BREAST BIOPSY Left     core bx, benign    COLONOSCOPY N/A 10/22/2019    Procedure: COLONOSCOPY;  Surgeon: Doug Vela MD;  Location: [address];  Service: Endoscopy;  Laterality: N/A;  had to reschedule pt-she ate a bite of biscuit and 2 spoonfulls of  amina-rescheduled to pm-pt did not want to schedule another day-requested different doc if available- [facility]    HYSTERECTOMY  2006    TVH, ovaries remain (AUB, Fibroids)    TONSILLECTOMY       Family History   Problem Relation Name Age of Onset    Arthritis Mother 91     Allergies Father 95     Hypertension Father 95     Asthma Sister 4     Arthritis Sister 4     Breast cancer Paternal Aunt      Diabetes Maternal Grandmother      Diabetes Maternal Grandfather      Diabetes Paternal Grandmother      Diabetes Paternal Grandfather      No Known Problems Daughter      No Known Problems Son      Colon cancer Neg Hx      Ovarian cancer Neg Hx      Esophageal cancer Neg Hx      Eczema Neg Hx       Social History     Tobacco Use    Smoking status: Never    Smokeless tobacco: Never    Tobacco comments:     smoked up to 2 packs for a year or so; eaten marijuana in the food periodically in the past last was 2009; assistant in childcare; ;    Substance Use Topics    Alcohol use: Yes     Comment: maybe one yearly    Drug use: No     Review of Systems    Physical Exam     Initial Vitals [09/17/24 1319]   BP Pulse Resp Temp SpO2   137/79 72 18 98.4 °F (36.9 °C) 98 %      MAP       --         Physical Exam    Nursing note and vitals reviewed.  Constitutional: She appears well-developed and well-nourished.   Eyes: Conjunctivae are normal. Pupils are equal, round, and reactive to light.   Neck: Neck supple.   Normal range of motion.  Cardiovascular:  Normal rate.           Pulmonary/Chest: Breath sounds normal.   Abdominal: Abdomen is soft. There is no abdominal tenderness.   Musculoskeletal:         General: Normal range of motion.      Cervical back: Normal range of motion and neck supple.     Neurological: She is alert and oriented to person, place, and time. GCS score is 15. GCS eye subscore is 4. GCS verbal subscore is 5. GCS motor subscore is 6.   Skin: Skin is warm and dry.         ED Course    Procedures  Labs Reviewed   CBC W/ AUTO DIFFERENTIAL - Abnormal       Result Value    WBC 9.80      RBC 5.81 (*)     Hemoglobin 12.4      Hematocrit 43.2      MCV 74 (*)     MCH 21.3 (*)     MCHC 28.7 (*)     RDW 18.6 (*)     Platelets 288      MPV 9.0 (*)     Immature Granulocytes 0.2      Gran # (ANC) 6.5      Immature Grans (Abs) 0.02      Lymph # 2.7      Mono # 0.5      Eos # 0.1      Baso # 0.04      nRBC 0      Gran % 66.0      Lymph % 27.6      Mono % 5.2      Eosinophil % 0.6      Basophil % 0.4      Differential Method Automated      Narrative:     Release to patient->Immediate   HIV 1 / 2 ANTIBODY    HIV 1/2 Ag/Ab Non-reactive      Narrative:     Release to patient->Immediate   HEPATITIS C ANTIBODY    Hepatitis C Ab Non-reactive      Narrative:     Release to patient->Immediate   COMPREHENSIVE METABOLIC PANEL    Sodium 144      Potassium 3.9      Chloride 106      CO2 29      Glucose 104      BUN 20      Creatinine 0.8      Calcium 9.5      Total Protein 7.1      Albumin 3.6      Total Bilirubin 0.3      Alkaline Phosphatase 122      AST 16      ALT 16      eGFR >60.0      Anion Gap 9      Narrative:     Release to patient->Immediate   TROPONIN I    Troponin I <0.006      Narrative:     Release to patient->Immediate   TROPONIN I    Troponin I <0.006          ECG Results              EKG 12-lead (Final result)        Collection Time Result Time QRS Duration OHS QTC Calculation    09/17/24 13:22:56 09/17/24 13:47:52 88 424                     Final result by Interface, Lab In [facility] (09/17/24 13:47:57)                   Narrative:    Test Reason : R68.89,    Vent. Rate : 067 BPM     Atrial Rate : 067 BPM     P-R Int : 114 ms          QRS Dur : 088 ms      QT Int : 402 ms       P-R-T Axes : -18 007 006 degrees     QTc Int : 424 ms    Normal sinus rhythm  Minimal voltage criteria for LVH, may be normal variant ( R in aVL )  Low septal forces  Abnormal R wave progression in the precordial leads  Abnormal  ECG  When compared with ECG of 16-SEP-2024 15:23,  Loss of QRS voltage across precordium  Confirmed by James Mann MD ([id number]) on 9/17/2024 1:47:49 PM    Referred By:             Confirmed By:James Mann MD                                  Imaging Results              X-Ray Chest PA And Lateral (Final result)  Result time 09/17/24 17:47:26      Final result by Elian August MD (09/17/24 17:47:26)                   Impression:      No radiographic acute intrathoracic process seen.      Electronically signed by: Elian August MD  Date:    09/17/2024  Time:    17:47               Narrative:    EXAMINATION:  XR CHEST PA AND LATERAL    CLINICAL HISTORY:  Chest pain, unspecified    TECHNIQUE:  PA and lateral views of the chest were performed.    COMPARISON:  Chest radiograph 09/16/2024 and CT abdomen and pelvis 11/16/2023    FINDINGS:  Clothing artifacts overlie the chest.  Patient is slightly rotated.    Bibasilar minimal platelike scarring versus atelectasis.  Medial left lower lobe small calcified granuloma again noted.  The lungs are otherwise well expanded without consolidation, pleural effusion or pneumothorax.    Cardiomediastinal silhouette is midline with similar calcification and slight tortuosity of the aorta and mildly enlarged heart.  Pulmonary vasculature and hilar contours are within normal limits.    Osseous structures appear grossly stable without acute findings.                                       Medications - No data to display  Medical Decision Making  63-year-old female presents ED with abnormal EKG.  Seen at urgent care yesterday for rapid breathing and chest tightness.      Differential includes but not limited to ACS, abnormal EKG, anxiety, GERD, pneumonia, chest wall pain    Patient currently is asymptomatic.  EKG today no longer shows the RSR pattern.  There is RSR pattern in lead 3 with T isolated T-wave inversion similar to previous.  No STEMI.  Out of abundance of caution will get  labs and chest x-ray.  Troponin negative x2.  No acute cardiopulmonary abnormality seen on chest x-ray.  Will discharge home discussed PCP follow-up as needed.  Return ED precautions given.    Amount and/or Complexity of Data Reviewed  Labs: ordered.  Radiology: ordered.                                      Clinical Impression:  Final diagnoses:  [R68.89] Multiple complaints  [R07.9] Chest pain  [R94.31] Abnormal EKG (Primary)          ED Disposition Condition    Discharge Stable          ED Prescriptions    None       Follow-up Information       Follow up With Specialties Details Why Contact Info    James Ulloa MD Family Medicine Schedule an appointment as soon as possible for a visit   [address]  [place] [address]  [phone]               Braydon Sloan PA-C  09/17/24 9579

## 2024-09-19 ENCOUNTER — PATIENT MESSAGE (OUTPATIENT)
Dept: ADMINISTRATIVE | Facility: OTHER | Age: 64
End: 2024-09-19
Payer: COMMERCIAL

## 2024-09-19 ENCOUNTER — OFFICE VISIT (OUTPATIENT)
Dept: INTERNAL MEDICINE | Facility: CLINIC | Age: 64
End: 2024-09-19
Payer: COMMERCIAL

## 2024-09-19 VITALS
HEART RATE: 78 BPM | BODY MASS INDEX: 30.56 KG/M2 | HEIGHT: 67 IN | SYSTOLIC BLOOD PRESSURE: 135 MMHG | WEIGHT: 194.69 LBS | DIASTOLIC BLOOD PRESSURE: 89 MMHG

## 2024-09-19 DIAGNOSIS — M47.812 SPONDYLOSIS OF CERVICAL REGION WITHOUT MYELOPATHY OR RADICULOPATHY: ICD-10-CM

## 2024-09-19 DIAGNOSIS — Z00.00 ANNUAL PHYSICAL EXAM: Primary | ICD-10-CM

## 2024-09-19 DIAGNOSIS — F43.20 ADJUSTMENT DISORDER, UNSPECIFIED TYPE: ICD-10-CM

## 2024-09-19 PROCEDURE — 99999 PR PBB SHADOW E&M-EST. PATIENT-LVL IV: CPT | Mod: PBBFAC,,,

## 2024-09-19 NOTE — PROGRESS NOTES
Meka Medina  1960        Subjective     Reason for Visit: establish care     History of Present Illness:  Ms. Meka Medina is a 63 y.o. female with a history of T2DM, HTN, asthma who presents to clinic for establishing care. Recently had an epidural for sciatica. Also recently went to the ED 9/17/24 for an abnormal EKG at an urgent care and was cleared. No other chest pain, pressure, or nausea. She takes Exforge and recently started taking hctz as well for the past 2 weeks. Her asthma is controlled with albuterol and knows her triggers (fragrances, temperature variations). Has multiple life stressors with family, work, finances, and recent surgery that has affected her work. She has had positive response with physical therapy. She likes making her own juices and eats limited fried foods. 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 She lives with her daughter.     #HTN  -uncontrolled BP  -continue amlodipine-valsartan (EXFORGE) 5-160 mg  -continue hctz 12.5   -recommend low salt diet    #T2DM  -A1c 5.6  -on Ozempic, not on insulin  -statin declined  -LDL 90    #asthma  -albuterol prn  -controlled    #sciatica  -start physical therapy    #adjustment disorder  -behavioral health referral  -psychiatry referral  -declines medications      ROS     PAST HISTORY:     Past Medical History:   Diagnosis Date    Allergy     Anemia     Asthma     Depression     Diabetes mellitus     Diverticulosis     GERD (gastroesophageal reflux disease)     Headache disorder     pt states due to cervical spondylosis    History of positive PPD     Hypertension     Intestinal metaplasia of gastric mucosa     Obesity     Spondylosis of cervical region without myelopathy or radiculopathy 12/13/2016    TIA (transient ischemic attack)     questionable history of    Vitamin D deficiency        Past Surgical History:   Procedure Laterality Date    BREAST BIOPSY Left     core bx, benign    COLONOSCOPY N/A 10/22/2019    Procedure: COLONOSCOPY;   Surgeon: Doug Vela MD;  Location: [facility] ([address] [place] [address]);  Service: Endoscopy;  Laterality: N/A;  had to reschedule pt-she ate a bite of biscuit and 2 spoonfulls of gritz-rescheduled to pm-pt did not want to schedule another day-requested different doc if available- [facility]-tb    HYSTERECTOMY  2006    TVH, ovaries remain (AUB, Fibroids)    TONSILLECTOMY         Family History   Problem Relation Name Age of Onset    Arthritis Mother 91     Allergies Father 95     Hypertension Father 95     Asthma Sister 4     Arthritis Sister 4     Breast cancer Paternal Aunt      Diabetes Maternal Grandmother      Diabetes Maternal Grandfather      Diabetes Paternal Grandmother      Diabetes Paternal Grandfather      No Known Problems Daughter      No Known Problems Son      Colon cancer Neg Hx      Ovarian cancer Neg Hx      Esophageal cancer Neg Hx      Eczema Neg Hx         Social History     Socioeconomic History    Marital status: Single   Tobacco Use    Smoking status: Never    Smokeless tobacco: Never    Tobacco comments:     smoked up to 2 packs for a year or so; eaten marijuana in the food periodically in the past last was 2009; assistant in childcare; ;    Substance and Sexual Activity    Alcohol use: Yes     Comment: maybe one yearly    Drug use: No    Sexual activity: Not Currently     Social Determinants of Health     Financial Resource Strain: High Risk (9/7/2023)    Overall Financial Resource Strain (CARDIA)     Difficulty of Paying Living Expenses: Hard   Food Insecurity: No Food Insecurity (9/7/2023)    Hunger Vital Sign     Worried About Running Out of Food in the Last Year: Never true     Ran Out of Food in the Last Year: Never true   Transportation Needs: No Transportation Needs (9/7/2023)    PRAPARE - Transportation     Lack of Transportation (Medical): No     Lack of Transportation (Non-Medical): No   Physical Activity: Sufficiently Active (9/7/2023)    Exercise Vital Sign     Days of  Exercise per Week: 5 days     Minutes of Exercise per Session: 60 min   Stress: No Stress Concern Present (9/7/2023)    [employer] [place] [employer] - Occupational Stress Questionnaire     Feeling of Stress : Not at all   Housing Stability: Unknown (9/7/2023)    Housing Stability Vital Sign     Unable to Pay for Housing in the Last Year: No     Unstable Housing in the Last Year: No       MEDICATIONS & ALLERGIES:     Current Outpatient Medications on File Prior to Visit   Medication Sig    albuterol (PROVENTIL/VENTOLIN HFA) 90 mcg/actuation inhaler Inhale 2 puffs into the lungs every 6 (six) hours as needed. Rescue    amlodipine-valsartan (EXFORGE)  mg per tablet Take 1 tablet by mouth daily    amlodipine-valsartan (EXFORGE) 5-160 mg per tablet Take 1 tablet by mouth 2 (two) times a day.    azelastine (ASTELIN) 137 mcg (0.1 %) nasal spray Use 2 sprays (274 mcg total) by Nasal route 2 (two) times daily.    chlorhexidine (PERIDEX) 0.12 % solution RINSE MOUTH WITH 15ML (1 CAPFUL) FOR 30 SECONDS MORNING AND EVENING AFTER TOOTHBRUSHING. EXPECTORATE AFTER RINSING, DO NOT SWALLOW    cholecalciferol, vitamin D3, (VITAMIN D3) 25 mcg (1,000 unit) capsule Take 1 capsule (1,000 Units total) by mouth once daily. (Patient not taking: Reported on 9/16/2024)    cycloSPORINE (RESTASIS) 0.05 % ophthalmic emulsion Place 1 drop into both eyes 2 (two) times daily.    fluticasone propionate (FLONASE) 50 mcg/actuation nasal spray 2 sprays (100 mcg total) by Each Nostril route once daily. (Patient not taking: Reported on 6/21/2024)    hydroCHLOROthiazide (HYDRODIURIL) 12.5 MG Tab Take 1 tablet by mouth daily    ibuprofen (ADVIL,MOTRIN) 600 MG tablet Take 1 tablet (600 mg total) by mouth every 8 (eight) hours as needed for Pain or Temperature greater than. (Patient not taking: Reported on 6/21/2024)    meloxicam (MOBIC) 15 MG tablet Take 1 tablet by mouth daily (Patient not taking: Reported on 9/16/2024)    montelukast  "(SINGULAIR) 10 mg tablet Take 1 tablet by mouth nightly (Patient not taking: Reported on 9/16/2024)    ondansetron (ZOFRAN-ODT) 4 MG TbDL Take 4 mg by mouth. (Patient not taking: Reported on 9/16/2024)    OZEMPIC 0.25 mg or 0.5 mg (2 mg/3 mL) pen injector Inject 0.25 mg into the skin once a week (Patient not taking: Reported on 6/21/2024)    semaglutide (OZEMPIC) 0.25 mg or 0.5 mg (2 mg/3 mL) pen injector Inject 0.25 mg into the skin every 7 days for 28 days, THEN 0.5 mg every 7 days. (Patient not taking: Reported on 6/21/2024)    [DISCONTINUED] amLODIPine-valsartan-hcthiazid (EXFORGE HCT) -25 mg Tab Take 1 tablet by mouth daily     No current facility-administered medications on file prior to visit.       Review of patient's allergies indicates:   Allergen Reactions    Iodine and iodide containing products Other (See Comments)     Pt had nausea, vomiting and seizure like activity.    Latex, natural rubber Rash    Penicillins Shortness Of Breath    Tomato Nausea And Vomiting    Tomato (solanum lycopersicum) Nausea And Vomiting    Vicodin [hydrocodone-acetaminophen] Nausea And Vomiting    Bananas [banana] Rash    Mcneil Nausea Only     magnolias       OBJECTIVE:        Vital Signs:  Vitals:    09/19/24 1450   BP: 135/89   BP Location: Right arm   Patient Position: Sitting   BP Method: Large (Automatic)   Pulse: 78   Weight: 88.3 kg (194 lb 10.7 oz)   Height: 5' 6.5" (1.689 m)       Body mass index is 30.95 kg/m².     Physical Exam:  Physical Exam  Constitutional:       General: She is not in acute distress.  HENT:      Head: Normocephalic and atraumatic.   Eyes:      Extraocular Movements: Extraocular movements intact.   Cardiovascular:      Rate and Rhythm: Normal rate and regular rhythm.   Pulmonary:      Effort: Pulmonary effort is normal. No respiratory distress.      Breath sounds: Normal breath sounds.   Abdominal:      General: There is no distension.      Palpations: Abdomen is soft.      Tenderness: " "There is no abdominal tenderness.   Feet:      Right foot:      Skin integrity: No ulcer.      Left foot:      Skin integrity: No ulcer.      Comments: Bilateral neuropathy  Neurological:      Mental Status: She is alert.          Laboratory  Lab Results   Component Value Date    WBC 9.80 09/17/2024    HGB 12.4 09/17/2024    HCT 43.2 09/17/2024    MCV 74 (L) 09/17/2024     09/17/2024     Lab Results   Component Value Date     09/17/2024     09/17/2024    K 3.9 09/17/2024     09/17/2024    CO2 29 09/17/2024    BUN 20 09/17/2024    CREATININE 0.8 09/17/2024    CALCIUM 9.5 09/17/2024    MG 2.1 11/16/2023    PROT 7.1 09/17/2024    BILITOT 0.3 09/17/2024    ALKPHOS 122 09/17/2024    ALT 16 09/17/2024    AST 16 09/17/2024     Lab Results   Component Value Date    INR 0.9 08/28/2024    INR 1.0 06/04/2008     Lab Results   Component Value Date    CHOL 197 04/26/2024    HDL 97 (H) 04/26/2024    LDLCALC 90 04/26/2024    TRIG 52 04/26/2024     Lab Results   Component Value Date    HGBA1C 5.6 08/28/2024     Lab Results   Component Value Date    TSH 1.16 04/26/2024     No results for input(s): "POCTGLUCOSE" in the last 72 hours.       Health Maintenance         Date Due Completion Date    High Dose Statin Never done ---    RSV Vaccine (Age 60+ and Pregnant patients) (1 - 1-dose 60+ series) Never done ---    Influenza Vaccine (1) 09/01/2024 10/11/2023    Override on 3/13/2023: Declined (Declined for season)    COVID-19 Vaccine (5 - 2023-24 season) 09/01/2024 5/30/2023    Diabetes Urine Screening 09/19/2024 9/19/2023    Eye Exam 02/26/2025 2/26/2024    Override on 2/26/2024: Done    Override on 5/15/2022: Done    Override on 6/1/2018: Done (per md note)    Hemoglobin A1c 02/28/2025 8/28/2024    Lipid Panel 04/26/2025 4/26/2024    Mammogram 07/12/2025 7/12/2024    Foot Exam 09/19/2025 9/19/2024 (Done)    Override on 9/19/2024: Done    Pneumococcal Vaccines (Age 0-64) (3 of 3 - PPSV23 or PCV20) 10/02/2027 " 8/14/2020    TETANUS VACCINE 05/03/2028 5/3/2018    Colorectal Cancer Screening 10/22/2029 10/22/2019                ASSESSMENT & PLAN:       Ms. Meka Medina is a 63 y.o. female who was seen today in clinic for establishing care.     Meka was seen today for establish care.    Diagnoses and all orders for this visit:    Annual physical exam    Spondylosis of cervical region without myelopathy or radiculopathy  -     Ambulatory referral/consult to Physical/Occupational Therapy; Future    Adjustment disorder, unspecified type  -     Ambulatory referral/consult to Behavioral Health; Future  -     Ambulatory referral/consult to Psychiatry; Future         1. Annual physical exam    2. Spondylosis of cervical region without myelopathy or radiculopathy    3. Adjustment disorder, unspecified type        Follow up in about 6 months (around 3/19/2025).    Other Orders Placed This Visit:  Orders Placed This Encounter   Procedures    Ambulatory referral/consult to Behavioral Health    Ambulatory referral/consult to Physical/Occupational Therapy    Ambulatory referral/consult to Psychiatry               Discussed with Dr. Mckeon - staff attestation to follow    Yolanda Akins DO  Internal Medicine, PGY-2  09/20/2024.2:55 PM  [facility]  Internal Medicine Resident Clinic  [address] [place] [address]  [phone]  [email or web address]

## 2024-09-24 ENCOUNTER — TELEPHONE (OUTPATIENT)
Dept: INTERNAL MEDICINE | Facility: CLINIC | Age: 64
End: 2024-09-24
Payer: COMMERCIAL

## 2024-09-24 NOTE — TELEPHONE ENCOUNTER
----- Message from Rivera Bourgeois sent at 9/24/2024  9:52 AM CDT -----  Contact: Patient @ [phone]  1MEDICALADVICE     Patient is calling for Medical Advice regarding: Patient is requesting that documents to be sent to HR    Patient wants a call back or thru [facility]: call    Comments:    Please advise patient replies from provider may take up to 48 hours.

## 2024-10-04 ENCOUNTER — TELEPHONE (OUTPATIENT)
Dept: INTERNAL MEDICINE | Facility: CLINIC | Age: 64
End: 2024-10-04
Payer: COMMERCIAL

## 2024-10-04 NOTE — TELEPHONE ENCOUNTER
----- Message from Rivera sent at 9/24/2024  9:52 AM CDT -----  Contact: Patient @ [phone]  1MEDICALADVICE     Patient is calling for Medical Advice regarding: Patient is requesting that documents to be sent to HR    Patient wants a call back or thru [facility]: call    Comments:    Please advise patient replies from provider may take up to 48 hours.

## 2024-10-04 NOTE — TELEPHONE ENCOUNTER
Left voice message   Did not have paper work   Only a letter that was written at time of visit   Dr Akins , explained she would not be able to the paper wor that is required and explained what the next option is

## 2024-10-04 NOTE — TELEPHONE ENCOUNTER
----- Message from Rivera sent at 9/24/2024  9:52 AM CDT -----  Contact: Patient @ [phone]  1MEDICALADVICE     Patient is calling for Medical Advice regarding: Patient is requesting that documents to be sent to HR    Patient wants a call back or thru [facility]: call    Comments:    Please advise patient replies from provider may take up to 48 hours.

## 2024-10-11 ENCOUNTER — TELEPHONE (OUTPATIENT)
Dept: INTERNAL MEDICINE | Facility: CLINIC | Age: 64
End: 2024-10-11
Payer: COMMERCIAL

## 2024-10-11 NOTE — TELEPHONE ENCOUNTER
----- Message from Jaspreet Acuna sent at 10/9/2024 10:23 AM CDT -----  Contact: [phone] Patient  Pt is returning call  ----- Message -----  From: Mary Rodriguez  Sent: 10/8/2024   2:40 PM CDT  To: [facility] Internal Medicine Residents    Patient is returning a phone call.    Who left a message for the patient: Tamera Ricketts LPN    Does patient know what this is regarding:      Would you like a call back, or a response through your [facility] portal?:   Call Back Please. Thank you    Comments:

## 2024-12-26 ENCOUNTER — HOSPITAL ENCOUNTER (OUTPATIENT)
Dept: RADIOLOGY | Facility: HOSPITAL | Age: 64
Discharge: HOME | End: 2024-12-26
Attending: FAMILY MEDICINE
Payer: COMMERCIAL

## 2024-12-26 DIAGNOSIS — Z12.31 BREAST CANCER SCREENING BY MAMMOGRAM: ICD-10-CM

## 2025-01-03 ENCOUNTER — OFFICE VISIT (OUTPATIENT)
Dept: ALLERGY | Facility: CLINIC | Age: 65
End: 2025-01-03
Payer: COMMERCIAL

## 2025-01-03 VITALS — BODY MASS INDEX: 32.73 KG/M2 | HEIGHT: 66 IN | WEIGHT: 203.69 LBS

## 2025-01-03 DIAGNOSIS — J45.20 MILD INTERMITTENT ASTHMA WITHOUT COMPLICATION: ICD-10-CM

## 2025-01-03 DIAGNOSIS — L29.9 PRURITUS: ICD-10-CM

## 2025-01-03 DIAGNOSIS — H10.13 ALLERGIC CONJUNCTIVITIS, BILATERAL: ICD-10-CM

## 2025-01-03 DIAGNOSIS — J30.9 CHRONIC ALLERGIC RHINITIS: Primary | ICD-10-CM

## 2025-01-03 DIAGNOSIS — T78.3XXD ANGIOEDEMA, SUBSEQUENT ENCOUNTER: ICD-10-CM

## 2025-01-03 PROCEDURE — 99999 PR PBB SHADOW E&M-EST. PATIENT-LVL IV: CPT | Mod: PBBFAC,,, | Performed by: ALLERGY & IMMUNOLOGY

## 2025-01-03 NOTE — PROGRESS NOTES
Subjective:       Patient ID: Meka Medina is a 64 y.o. female.    Chief Complaint:  Allergies      65 yo woman presents for continued evaluation of allergic rhinitis and conjunctivitis, asthma and face swelling and itching. She was last seen 6/21/2024. She had labs with positive immunocaps to cat, cockroach, grass, trees and weeds. Also had low positive to peanut but rest negative. had normal C1 esterase, thyroid and SPEP and negative IgE receptor antibody. She has had no more swelling.   She is teaching ESL middle school and having issues with fragrances but has been able to get them to avoid mor lately so been ok.  She is taking azelastine 2 SEN BID and albuterol prn. She does have runny nose but not much sneeze. Has some congestion.   She recently returned home from [place] where caring for her father who is 96 but dying from pancreatic cancer. On return she had itching of hands then small bumps started Wednesday and got worse so on Friday ended up in UC. She also had some under chin. She was given few days prednisone which helped and she tolerated as well as hydroxyzine which helped but made her sleepy. Mostly resolved except fro one bump.   .     Prior History taken 1/22/2021: new patient evaluation of face swelling. She states for at least 6 months she gets eye, nose and lip swelling. Off and on. No SOB. The skin will feel tight but no hives or itch. She also has rhinitis with congestion.PND, itchy nose and skin. Seems worse with scents like form nail salon, dog, curtains, pine pollen. Also has diarrhea with tomato products and rash wit latex and banana so avoids those. She takes benadryl prn for swelling. Tried xyzal but made too sleepy. She has asthma but rarely uses albuterol. No eczema. Has HTN and DM        Environmental History: see history section for home environment  Review of Systems   Constitutional:  Negative for appetite change, chills, fatigue and fever.   HENT:  Positive for congestion, postnasal drip  and rhinorrhea. Negative for ear discharge, ear pain, facial swelling, nosebleeds, sinus pressure, sneezing, sore throat, trouble swallowing and voice change.    Eyes:  Negative for discharge, redness, itching and visual disturbance.   Respiratory:  Positive for cough, chest tightness and shortness of breath. Negative for choking and wheezing.    Cardiovascular:  Negative for chest pain, palpitations and leg swelling.   Gastrointestinal:  Negative for abdominal distention, abdominal pain, constipation, diarrhea, nausea and vomiting.   Genitourinary:  Negative for difficulty urinating.   Musculoskeletal:  Negative for arthralgias, gait problem, joint swelling and myalgias.   Skin:  Positive for rash. Negative for color change.   Neurological:  Positive for headaches. Negative for dizziness, syncope, weakness and light-headedness.   Hematological:  Negative for adenopathy. Does not bruise/bleed easily.   Psychiatric/Behavioral:  Negative for agitation, behavioral problems, confusion and sleep disturbance. The patient is not nervous/anxious.         Objective:      Physical Exam  Vitals and nursing note reviewed.   Constitutional:       General: She is not in acute distress.     Appearance: Normal appearance. She is not ill-appearing.   HENT:      Nose: No rhinorrhea.   Eyes:      General:         Right eye: No discharge.         Left eye: No discharge.      Conjunctiva/sclera: Conjunctivae normal.   Pulmonary:      Effort: Pulmonary effort is normal. No respiratory distress.   Abdominal:      General: There is no distension.   Skin:     General: Skin is warm and dry.      Findings: No erythema or rash.   Neurological:      Mental Status: She is alert and oriented to person, place, and time.   Psychiatric:         Mood and Affect: Mood normal.         Behavior: Behavior normal.         Laboratory:   none performed   Assessment:       1. Chronic allergic rhinitis    2. Allergic conjunctivitis, bilateral    3. Mild  intermittent asthma without complication    4. Angioedema, subsequent encounter    5. Pruritus         Plan:       1. Dermatitis- advised rash she had is not C/w food or environment allergy and likely more contact vs viral, resolved now so no further treatment or eval needed  2. Pt with RAD/asthma and is triggered by scents, viruses, allergens. Discussed in detail with pt and at last visit forms were filled out for work to have fragrance free environment  3. Continue azelastine 2 SEN BID and add montelukast 10 mg daily  4. Albuterol 2 puff every 4 hours, advised ideal is to use less then 2 times per week so if not better with montelukast then may need ICS  5. Cat avoidance  6. She does not eat peanuts so not cause of face swelling. She will continue to avoid as does not like  7. RTC 6 months or sooner if not controlled    I spent a total of 40 minutes on the day of the visit.  This includes face to face time and non-face to face time preparing to see the patient (eg, review of tests), obtaining and/or reviewing separately obtained history, documenting clinical information in the electronic or other health record, independently interpreting results and communicating results to the patient/family/caregiver, or care coordinator.

## 2025-01-21 DIAGNOSIS — Z12.39 ENCOUNTER FOR SCREENING FOR MALIGNANT NEOPLASM OF BREAST, UNSPECIFIED SCREENING MODALITY: Primary | ICD-10-CM

## 2025-03-17 ENCOUNTER — OFFICE VISIT (OUTPATIENT)
Dept: URGENT CARE | Facility: CLINIC | Age: 65
End: 2025-03-17
Payer: COMMERCIAL

## 2025-03-17 VITALS
TEMPERATURE: 98 F | RESPIRATION RATE: 20 BRPM | BODY MASS INDEX: 32.14 KG/M2 | SYSTOLIC BLOOD PRESSURE: 139 MMHG | HEIGHT: 66 IN | OXYGEN SATURATION: 98 % | HEART RATE: 77 BPM | WEIGHT: 200 LBS | DIASTOLIC BLOOD PRESSURE: 75 MMHG

## 2025-03-17 DIAGNOSIS — R51.9 SINUS HEADACHE: ICD-10-CM

## 2025-03-17 DIAGNOSIS — J30.9 ALLERGIC RHINITIS, UNSPECIFIED SEASONALITY, UNSPECIFIED TRIGGER: ICD-10-CM

## 2025-03-17 DIAGNOSIS — J34.89 RHINORRHEA: ICD-10-CM

## 2025-03-17 DIAGNOSIS — J01.00 ACUTE MAXILLARY SINUSITIS, RECURRENCE NOT SPECIFIED: Primary | ICD-10-CM

## 2025-03-17 LAB
CTP QC/QA: YES
SARS CORONAVIRUS 2 ANTIGEN: NEGATIVE

## 2025-03-17 PROCEDURE — 87811 SARS-COV-2 COVID19 W/OPTIC: CPT | Mod: QW,S$GLB,, | Performed by: FAMILY MEDICINE

## 2025-03-17 PROCEDURE — 99214 OFFICE O/P EST MOD 30 MIN: CPT | Mod: S$GLB,,, | Performed by: FAMILY MEDICINE

## 2025-03-17 RX ORDER — AZELASTINE 1 MG/ML
2 SPRAY, METERED NASAL 2 TIMES DAILY
Qty: 30 ML | Refills: 12 | Status: SHIPPED | OUTPATIENT
Start: 2025-03-17

## 2025-03-17 RX ORDER — FLUTICASONE PROPIONATE 50 MCG
2 SPRAY, SUSPENSION (ML) NASAL DAILY
Qty: 48 G | Refills: 2 | Status: SHIPPED | OUTPATIENT
Start: 2025-03-17

## 2025-03-17 RX ORDER — DOXYCYCLINE HYCLATE 100 MG
100 TABLET ORAL 2 TIMES DAILY
Qty: 14 TABLET | Refills: 0 | Status: SHIPPED | OUTPATIENT
Start: 2025-03-17 | End: 2025-03-24

## 2025-03-17 NOTE — LETTER
March 17, 2025      [facility] [address] [address]  Phone: [phone]  Fax: [phone]       Patient: Meka Medina   YOB: 1960  Date of Visit: 03/17/2025    To Whom It May Concern:    Isaac Medina  was at [facility] on 03/17/2025. The patient may return to work/school on 3/19/25 as long as symptoms have improved and patient is fever free.. If you have any questions or concerns, or if I can be of further assistance, please do not hesitate to contact me.    Sincerely,    Danette Escobedo, DO

## 2025-03-17 NOTE — PROGRESS NOTES
"Subjective:      Patient ID: Meka Medina is a 64 y.o. female.    Vitals:  height is 5' 6" (1.676 m) and weight is 90.7 kg (200 lb). Her oral temperature is 98.4 °F (36.9 °C). Her blood pressure is 139/75 and her pulse is 77. Her respiration is 20 and oxygen saturation is 98%.     Chief Complaint: Headache    Meka Medina is a 64 y.o. female who presents today with a chief complaint of nasal congestion, sinus pressure, & headaches that began several days ago.  Frontal headache is worsening and intermittent.  Associated with sinus congestion.  No neuro symptoms such as numbness tingling weakness.  She has been checking your blood pressure and it has been pretty well controlled at home.      Patient took OTC Flonase which helps with the congestion.     Sinus Problem  This is a new problem. The current episode started in the past 7 days. The problem has been gradually worsening since onset. There has been no fever. Her pain is at a severity of 8/10. The pain is severe. Associated symptoms include congestion, headaches and sinus pressure. Pertinent negatives include no chills, coughing, diaphoresis, ear pain, hoarse voice, neck pain, shortness of breath, sneezing, sore throat or swollen glands. Past treatments include saline nose sprays. The treatment provided mild relief.     Constitution: Negative for chills and sweating.   HENT:  Positive for congestion and sinus pressure. Negative for ear pain and sore throat.    Neck: Negative for neck pain.   Respiratory:  Negative for cough and shortness of breath.    Allergic/Immunologic: Negative for sneezing.   Neurological:  Positive for headaches.      Objective:     Physical Exam  Constitutional: Pt oriented to person, place, and time.  Non-toxic appearance.   Patient does not appear ill. No distress. normal  HENT: No icterus or facial swelling appreciated  Head: Normocephalic and atraumatic.   Nose: + congestion.   Ears:  Left: TM without erythema, bulging or retraction. EAC " without drainage or debris/cerumen impaction or swelling, external ear structures normal  Right: TM without erythema, bulging or retraction. EAC without drainage or debris/cerumen impaction or swelling, external ear structures normal  Oropharynx: pharynx/tonsils without erythema or exudates. No uvular shift or soft palate swelling. No stridor    Pulmonary/Chest: Effort normal. No stridor. No respiratory distress.   Abdominal: Normal appearance. Abdomen exhibits no distension.   Musculoskeletal:         General: No swelling.   Neurological: no focal deficit. Patient is alert and oriented to person, place, and time.   Skin: Skin is not diaphoretic and not pale. no jaundice  Psychiatric: Patients behavior is normal. Mood, judgment and thought content normal.       Results for orders placed or performed in visit on 03/17/25   SARS Coronavirus 2 Antigen, POCT Manual Read    Collection Time: 03/17/25  4:11 PM   Result Value Ref Range    SARS Coronavirus 2 Antigen Negative Negative, Presumptive Negative     Acceptable Yes        Assessment:     1. Acute maxillary sinusitis, recurrence not specified    2. Sinus headache    3. Rhinorrhea    4. Allergic rhinitis, unspecified seasonality, unspecified trigger        Plan:       Acute maxillary sinusitis, recurrence not specified  -     doxycycline (VIBRA-TABS) 100 MG tablet; Take 1 tablet (100 mg total) by mouth 2 (two) times daily. for 7 days  Dispense: 14 tablet; Refill: 0    Sinus headache  -     SARS Coronavirus 2 Antigen, POCT Manual Read    Rhinorrhea  -     fluticasone propionate (FLONASE) 50 mcg/actuation nasal spray; 2 sprays (100 mcg total) by Each Nostril route once daily.  Dispense: 48 g; Refill: 2    Allergic rhinitis, unspecified seasonality, unspecified trigger  -     azelastine (ASTELIN) 137 mcg (0.1 %) nasal spray; Use 2 sprays (274 mcg total) by Nasal route 2 (two) times daily.  Dispense: 30 mL; Refill: 12      If headache worsening at any time  recommend the ER for further evaluation and management which may include stat neuroimaging.

## 2025-04-03 ENCOUNTER — OFFICE VISIT (OUTPATIENT)
Dept: OPTOMETRY | Facility: CLINIC | Age: 65
End: 2025-04-03
Payer: COMMERCIAL

## 2025-04-03 DIAGNOSIS — H25.13 NS (NUCLEAR SCLEROSIS), BILATERAL: ICD-10-CM

## 2025-04-03 DIAGNOSIS — E11.9 TYPE 2 DIABETES MELLITUS WITHOUT COMPLICATION, WITHOUT LONG-TERM CURRENT USE OF INSULIN: Primary | ICD-10-CM

## 2025-04-03 DIAGNOSIS — E11.9 TYPE 2 DIABETES MELLITUS WITHOUT OPHTHALMIC MANIFESTATIONS: ICD-10-CM

## 2025-04-03 DIAGNOSIS — H52.4 PRESBYOPIA: ICD-10-CM

## 2025-04-03 DIAGNOSIS — H04.123 DRY EYE SYNDROME, BILATERAL: ICD-10-CM

## 2025-04-03 PROCEDURE — 99999 PR PBB SHADOW E&M-EST. PATIENT-LVL III: CPT | Mod: PBBFAC,,, | Performed by: OPTOMETRIST

## 2025-04-03 PROCEDURE — 92015 DETERMINE REFRACTIVE STATE: CPT | Mod: S$GLB,,, | Performed by: OPTOMETRIST

## 2025-04-03 PROCEDURE — 1159F MED LIST DOCD IN RCRD: CPT | Mod: CPTII,S$GLB,, | Performed by: OPTOMETRIST

## 2025-04-03 PROCEDURE — 92014 COMPRE OPH EXAM EST PT 1/>: CPT | Mod: S$GLB,,, | Performed by: OPTOMETRIST

## 2025-04-03 PROCEDURE — 2023F DILAT RTA XM W/O RTNOPTHY: CPT | Mod: CPTII,S$GLB,, | Performed by: OPTOMETRIST

## 2025-04-03 PROCEDURE — 4010F ACE/ARB THERAPY RXD/TAKEN: CPT | Mod: CPTII,S$GLB,, | Performed by: OPTOMETRIST

## 2025-04-03 RX ORDER — CYCLOSPORINE 0.5 MG/ML
1 EMULSION OPHTHALMIC 2 TIMES DAILY
Qty: 180 EACH | Refills: 3 | Status: SHIPPED | OUTPATIENT
Start: 2025-04-03 | End: 2026-04-03

## 2025-04-03 NOTE — PROGRESS NOTES
HPI    Dls: 2/26/24 Dr. Whalen    63 y/o female presents today for diabetic  and hypertensive eye exam.  Pt c/o blurry vision at distance and near ou. Pt c/o problems seeing at   night when driving.   Pt wears pal's     LBS ?     + ou off/on tearing  + ou off/on itching  + ou  burning  No pain  + ha's  + ou off/on floaters  No flashes    Eye meds  Restatsis OU QD-BID     Pohx:   None    Fohx:   ? Family history     Hemoglobin A1C       Date                     Value               Ref Range             Status                08/28/2024               5.6                 4.7 - 5.6 %           Final                 04/26/2024               5.0                 4.7 - 5.6 %           Final                 09/19/2023               5.4                 4.0 - 5.6 %           Final              Comment:    ADA Screening Guidelines:  5.7-6.4%  Consistent with   prediabetes  >or=6.5%  Consistent with diabetes    High levels of fetal   hemoglobin interfere with the HbA1C  assay. Heterozygous hemoglobin   variants (HbS, HgC, etc)do  not significantly interfere with this assay.     However, presence of multiple variants may affect accuracy.         05/30/2023               5.1                 4.0 - 5.6 %           Final              Comment:    ADA Screening Guidelines:  5.7-6.4%  Consistent with   prediabetes  >or=6.5%  Consistent with diabetes    High levels of fetal   hemoglobin interfere with the HbA1C  assay. Heterozygous hemoglobin   variants (HbS, HgC, etc)do  not significantly interfere with this assay.     However, presence of multiple variants may affect accuracy.         09/03/2022               5.5                 4.0 - 5.6 %           Final              Comment:    ADA Screening Guidelines:  5.7-6.4%  Consistent with   prediabetes  >or=6.5%  Consistent with diabetes    High levels of fetal   hemoglobin interfere with the HbA1C  assay. Heterozygous hemoglobin   variants (HbS, HgC, etc)do  not significantly interfere with  this assay.     However, presence of multiple variants may affect accuracy.    ----------    Last edited by Annetta Mclean MA on 4/3/2025 10:04 AM.            Assessment /Plan     For exam results, see Encounter Report.    Type 2 diabetes mellitus without complication, without long-term current use of insulin  Type 2 diabetes mellitus without ophthalmic manifestations   Monitor yearly with DFE    NS (nuclear sclerosis), bilateral   Mild, monitor    Presbyopia   Rx specs   Change distance and near power    Dry eye syndrome, bilateral  Restart     cycloSPORINE (RESTASIS) 0.05 % ophthalmic emulsion; Place 1 drop into both eyes 2 (two) times daily.  Dispense: 180 each; Refill: 3   Pt was using PRN- infrequently    RTC 1 year

## 2025-04-10 ENCOUNTER — PATIENT MESSAGE (OUTPATIENT)
Dept: OTOLARYNGOLOGY | Facility: CLINIC | Age: 65
End: 2025-04-10
Payer: COMMERCIAL

## 2025-04-10 ENCOUNTER — OFFICE VISIT (OUTPATIENT)
Dept: OTOLARYNGOLOGY | Facility: CLINIC | Age: 65
End: 2025-04-10
Payer: COMMERCIAL

## 2025-04-10 VITALS — BODY MASS INDEX: 32.63 KG/M2 | WEIGHT: 202.19 LBS

## 2025-04-10 DIAGNOSIS — J30.89 PERENNIAL ALLERGIC RHINITIS WITH SEASONAL VARIATION: ICD-10-CM

## 2025-04-10 DIAGNOSIS — J30.2 PERENNIAL ALLERGIC RHINITIS WITH SEASONAL VARIATION: ICD-10-CM

## 2025-04-10 DIAGNOSIS — G43.809 OTHER MIGRAINE WITHOUT STATUS MIGRAINOSUS, NOT INTRACTABLE: Primary | ICD-10-CM

## 2025-04-10 PROCEDURE — 4010F ACE/ARB THERAPY RXD/TAKEN: CPT | Mod: CPTII,S$GLB,, | Performed by: PHYSICIAN ASSISTANT

## 2025-04-10 PROCEDURE — 99999 PR PBB SHADOW E&M-EST. PATIENT-LVL III: CPT | Mod: PBBFAC,,, | Performed by: PHYSICIAN ASSISTANT

## 2025-04-10 PROCEDURE — 99214 OFFICE O/P EST MOD 30 MIN: CPT | Mod: S$GLB,,, | Performed by: PHYSICIAN ASSISTANT

## 2025-04-10 PROCEDURE — 3008F BODY MASS INDEX DOCD: CPT | Mod: CPTII,S$GLB,, | Performed by: PHYSICIAN ASSISTANT

## 2025-04-10 PROCEDURE — 1159F MED LIST DOCD IN RCRD: CPT | Mod: CPTII,S$GLB,, | Performed by: PHYSICIAN ASSISTANT

## 2025-04-10 NOTE — PROGRESS NOTES
Subjective:     HPI: Meka Medina is a 64 y.o. female who was self-referred for headaches.    Patient reports developing a waxing and waning dull headache with associated nausea, vomiting, dysgeusia, rhinorrhea, and photophobia.  Headache is located on the top of her head and usually lasts no longer than an hour.  No clear alleviating factors.  Prior headaches similar to this exacerbated by exposure to fragrances or flowers.  Patient reports recent event at work with lots of smells that seemed to trigger her headache.  She does report sick contacts but denies any nasal obstruction, discolored nasal discharge, facial pressure or trouble swallowing.  Per chart review, she has had similar episodes in the past and was evaluated by ENT Dr. Shankar in 2020.  She underwent CT sinus and nasal endoscopy with overall benign findings.  She was referred to allergy and underwent allergy testing.  Patient has been compliant with Flonase and azelastine.    Current sinonasal medications as above.  The last course of antibiotics was recently (doxycycline on 3/17/25)    She recalls previously having allergy testing, which was reportedly positive for cat dander, cockroach, grass pollen, tree pollen, and weeds.  She relates a history of asthma which is currently managed with prn albuterol.  She relates a history of reflux symptoms which is managed with over-the-counter medication as needed.    She relates a diagnosis of obstructive sleep apnea without regular CPAP use.   She does not recall a prior history of nasal trauma.  She has not had sinonasal surgery.    She has had a tonsillectomy.    Past Medical/Past Surgical History  Past Medical History:   Diagnosis Date    Allergy     Anemia     Asthma     Depression     Diabetes mellitus     Diverticulosis     GERD (gastroesophageal reflux disease)     Headache disorder     pt states due to cervical spondylosis    History of positive PPD     Hypertension     Intestinal metaplasia of gastric  mucosa     Obesity     Spondylosis of cervical region without myelopathy or radiculopathy 12/13/2016    TIA (transient ischemic attack)     questionable history of    Vitamin D deficiency      She has a past surgical history that includes Tonsillectomy; Hysterectomy (2006); Breast biopsy (Left); and Colonoscopy (N/A, 10/22/2019).    Family History/Social History  Her family history includes Allergies in her father; Arthritis in her mother and sister; Asthma in her sister; Breast cancer in her paternal aunt; Diabetes in her maternal grandfather, maternal grandmother, paternal grandfather, and paternal grandmother; Hypertension in her father; No Known Problems in her daughter and son.  She reports that she has never smoked. She has never been exposed to tobacco smoke. She has never used smokeless tobacco. She reports current alcohol use. She reports that she does not use drugs.    Allergies/Immunizations  She is allergic to iodine and iodide containing products; latex, natural rubber; penicillins; tomato; tomato (solanum lycopersicum); vicodin [hydrocodone-acetaminophen]; bananas [banana]; and flowers.  Immunization History   Administered Date(s) Administered    COVID-19, MRNA, LN-S, PF (Pfizer) (Purple Cap) 03/03/2021, 03/25/2021, 11/18/2021    COVID-19, mRNA, LNP-S, bivalent booster, PF (PFIZER OMICRON) 05/30/2023    Influenza - Quadrivalent - PF *Preferred* (6 months and older) 09/10/2019, 10/27/2020, 10/27/2020, 11/18/2021, 10/11/2023    Influenza - Trivalent - Fluarix, Flulaval, Fluzone, Afluria - PF 12/03/2018    Pneumococcal Conjugate - 13 Valent 04/25/2019    Pneumococcal Polysaccharide - 23 Valent 08/14/2020    Tdap 01/19/2014, 05/03/2018    Zoster Recombinant 08/09/2021, 10/13/2021        Medications   acetaminophen  albuterol  amlodipine-valsartan  azelastine  chlorhexidine  cycloSPORINE  fluticasone propionate  hydroCHLOROthiazide Tab  hydrOXYzine HCL  ibuprofen  meloxicam  montelukast  ondansetron  Tbdl  OZEMPIC Pnij  predniSONE  sertraline     Review of Systems   HENT: Positive for runny nose.  Negative for sinus pressure and stuffy nose.      Eyes:  Positive for photophobia.     Respiratory:  Positive for sleep apnea. Negative for cough.      Gastrointestinal:  Positive for vomiting. +nausea    Neurological: Positive for headaches.           Objective:     Wt 91.7 kg (202 lb 2.6 oz)   BMI 32.63 kg/m²      Physical Exam  Constitutional:       Appearance: Normal appearance.   HENT:      Head: Normocephalic and atraumatic.      Right Ear: External ear normal.      Left Ear: External ear normal.      Nose: Septal deviation (left) present.      Right Nostril: No epistaxis.      Left Nostril: No epistaxis.      Right Turbinates: Enlarged.      Left Turbinates: Enlarged.      Right Sinus: No maxillary sinus tenderness or frontal sinus tenderness.      Left Sinus: No maxillary sinus tenderness or frontal sinus tenderness.      Mouth/Throat:      Lips: Pink.      Mouth: Mucous membranes are moist.      Pharynx: Oropharynx is clear. Uvula midline.      Tonsils: 0 on the right. 0 on the left.   Neck:      Thyroid: No thyromegaly or thyroid tenderness.   Pulmonary:      Effort: Pulmonary effort is normal.   Lymphadenopathy:      Cervical: No cervical adenopathy.   Neurological:      Mental Status: She is alert.          Procedure    None    Data Reviewed  I personally reviewed the chart, including any outside records, and pertinent data below:    I reviewed the following notes Neurology, Internal Medicine, and ENT     WBC (K/uL)   Date Value   09/17/2024 9.80     Lymph # (K/uL)   Date Value   09/17/2024 2.7     Lymph % (%)   Date Value   09/17/2024 27.6     Eosinophil % (%)   Date Value   09/17/2024 0.6     Total IgE (IU/mL)   Date Value   01/22/2021 174 (H)     Eos # (K/uL)   Date Value   09/17/2024 0.1       Platelets (K/uL)   Date Value   09/17/2024 288     Glucose (mg/dL)   Date Value   09/17/2024 104       I  independently reviewed the images of the CT head dated 9/29/22. Pertinent sinus findings include patent sinuses.maxillary sinuses only partially visualized.  Prior CT sinus with evidence of right maxillary mucous retention cyst.     Assessment & Plan:     1. Other migraine without status migrainosus, not intractable  -     Clinically this is a migraine  -  Referred to neurology for further management and investigation  - Ambulatory referral/consult to Neurology; Future; Expected date: 04/17/2025    2. Perennial allergic rhinitis with seasonal variation  - continue flonase and azelastine    She will follow up as needed  I had a discussion with the patient regarding her condition and the further workup and management options.    All questions were answered, and the patient is in agreement with the above.     Disclaimer:  This note may have been prepared utilizing voice recognition software which may result in occasional typographical errors in the text such as sound alike words.   If further clarification is needed, please contact the ENT department of [facility].

## 2025-05-21 ENCOUNTER — OFFICE VISIT (OUTPATIENT)
Dept: NEUROLOGY | Facility: CLINIC | Age: 65
End: 2025-05-21
Payer: COMMERCIAL

## 2025-05-21 ENCOUNTER — PATIENT MESSAGE (OUTPATIENT)
Facility: CLINIC | Age: 65
End: 2025-05-21

## 2025-05-21 ENCOUNTER — RESULTS FOLLOW-UP (OUTPATIENT)
Dept: NEUROLOGY | Facility: CLINIC | Age: 65
End: 2025-05-21

## 2025-05-21 ENCOUNTER — LAB VISIT (OUTPATIENT)
Dept: LAB | Facility: HOSPITAL | Age: 65
End: 2025-05-21

## 2025-05-21 VITALS
WEIGHT: 204.19 LBS | BODY MASS INDEX: 32.82 KG/M2 | HEIGHT: 66 IN | SYSTOLIC BLOOD PRESSURE: 129 MMHG | HEART RATE: 74 BPM | DIASTOLIC BLOOD PRESSURE: 84 MMHG

## 2025-05-21 DIAGNOSIS — R20.0 RIGHT SIDED NUMBNESS: ICD-10-CM

## 2025-05-21 DIAGNOSIS — R51.9 WORSENING HEADACHES: ICD-10-CM

## 2025-05-21 DIAGNOSIS — G43.109 BASILAR MIGRAINE: ICD-10-CM

## 2025-05-21 DIAGNOSIS — G43.119 INTRACTABLE MIGRAINE WITH AURA WITHOUT STATUS MIGRAINOSUS: Primary | ICD-10-CM

## 2025-05-21 LAB
ANION GAP (OHS): 13 MMOL/L (ref 8–16)
BUN SERPL-MCNC: 16 MG/DL (ref 8–23)
CALCIUM SERPL-MCNC: 9.4 MG/DL (ref 8.7–10.5)
CHLORIDE SERPL-SCNC: 103 MMOL/L (ref 95–110)
CO2 SERPL-SCNC: 29 MMOL/L (ref 23–29)
CREAT SERPL-MCNC: 0.8 MG/DL (ref 0.5–1.4)
GFR SERPLBLD CREATININE-BSD FMLA CKD-EPI: >60 ML/MIN/1.73/M2
GLUCOSE SERPL-MCNC: 116 MG/DL (ref 70–110)
POTASSIUM SERPL-SCNC: 3.8 MMOL/L (ref 3.5–5.1)
SODIUM SERPL-SCNC: 145 MMOL/L (ref 136–145)

## 2025-05-21 PROCEDURE — 3008F BODY MASS INDEX DOCD: CPT | Mod: CPTII,S$GLB,, | Performed by: STUDENT IN AN ORGANIZED HEALTH CARE EDUCATION/TRAINING PROGRAM

## 2025-05-21 PROCEDURE — 4010F ACE/ARB THERAPY RXD/TAKEN: CPT | Mod: CPTII,S$GLB,, | Performed by: STUDENT IN AN ORGANIZED HEALTH CARE EDUCATION/TRAINING PROGRAM

## 2025-05-21 PROCEDURE — 36415 COLL VENOUS BLD VENIPUNCTURE: CPT

## 2025-05-21 PROCEDURE — 80048 BASIC METABOLIC PNL TOTAL CA: CPT

## 2025-05-21 PROCEDURE — 1159F MED LIST DOCD IN RCRD: CPT | Mod: CPTII,S$GLB,, | Performed by: STUDENT IN AN ORGANIZED HEALTH CARE EDUCATION/TRAINING PROGRAM

## 2025-05-21 PROCEDURE — 3079F DIAST BP 80-89 MM HG: CPT | Mod: CPTII,S$GLB,, | Performed by: STUDENT IN AN ORGANIZED HEALTH CARE EDUCATION/TRAINING PROGRAM

## 2025-05-21 PROCEDURE — 3074F SYST BP LT 130 MM HG: CPT | Mod: CPTII,S$GLB,, | Performed by: STUDENT IN AN ORGANIZED HEALTH CARE EDUCATION/TRAINING PROGRAM

## 2025-05-21 PROCEDURE — 99204 OFFICE O/P NEW MOD 45 MIN: CPT | Mod: S$GLB,,, | Performed by: STUDENT IN AN ORGANIZED HEALTH CARE EDUCATION/TRAINING PROGRAM

## 2025-05-21 PROCEDURE — 99999 PR PBB SHADOW E&M-EST. PATIENT-LVL V: CPT | Mod: PBBFAC,,, | Performed by: STUDENT IN AN ORGANIZED HEALTH CARE EDUCATION/TRAINING PROGRAM

## 2025-05-21 RX ORDER — UBROGEPANT 100 MG/1
TABLET ORAL
Qty: 10 TABLET | Refills: 11 | Status: SHIPPED | OUTPATIENT
Start: 2025-05-21

## 2025-05-21 RX ORDER — LANOLIN ALCOHOL/MO/W.PET/CERES
400 CREAM (GRAM) TOPICAL DAILY
Qty: 60 TABLET | Refills: 5 | Status: SHIPPED | OUTPATIENT
Start: 2025-05-21

## 2025-05-21 NOTE — PROGRESS NOTES
Patient ID: [id number]  Referring Physician: Roger Dee PA-C    Chief Complaint/Reason for Consult: Headaches  Subjective:     HPI:  Meka Medina is a 64 y.o. RH female who  has a past medical history of Allergy, Anemia, Asthma, Depression, Diabetes mellitus, Diverticulosis, GERD (gastroesophageal reflux disease), Headache disorder, History of positive PPD, Hypertension, Intestinal metaplasia of gastric mucosa, Obesity, Spondylosis of cervical region without myelopathy or radiculopathy, TIA (transient ischemic attack), and Vitamin D deficiency. she is presenting today as a new patient for evaluation of worsening headaches.     Headache history  Age at onset: unclear  Course over time: increasing in frequency x1 year  Location: varies, frontal/face  Character: poorly described  Intensity: >10 on a scale from 1 to 10  Frequency: once per week.   Duration: unclear  Timing: do not seem to be related to any time of the day   Mild/moderate/severe. Work attendance or other daily activities are affected by the headaches. She couldn't drive while having a headaches.  Aura: not preceded by an aura  Associated symptoms: N/V and Photosensitivity and sensitivity to smells  Associated neurologic symptoms: dizziness, confusion, slowed speech  Precipitating factors: Odors  Aggravating factors: Physical activity , exposure to light, and Smells  Home treatment: Tylenol with no improvement. She tries to meditate.   ER visits: No  Positive Hx of: Head trauma  Is medication overuse contributing to the patient's current migraine burden: No    Headache Medication history:  AED Neuromodulators  MAOIs  Ergot Alkaloids    Acetazolamide (Diamox) [] Phenelzine (Nardil) [] Dihydroergotamine (Migranal) []   Carbamazepine (Tegretol) [] Tranylcypromine (Parnate) [] Ergotamine (Ergomar) []   Gabapentin (Neurontin) [] Antihistamine/Serotonergic  Triptans    Lacosamide (Vimpat) [] Cyproheptadine (Periactin) [] Almotriptan (Axert) []    Lamotrigine (Lamictal) [] Antihypertensives  Eletriptan (Relpax) []   Levatiracetam (Keppra) [] Atenolol (Tenormin) [] Frovatriptan (Frova) []   Oxcarbazepine (Trileptal) [] Bisoprostol (Zebeta) [] Naratriptan (Amerge) []   Levetiracetam (Keppra) [] Candesartan (Atacand) [] Rizatriptan (Maxalt) []   Pregabalin (Lyrica) [] Carvedilol (Coreg)  Sumatriptan (Imitrex) []   Topiramate (Topamax)  (Trokendi) [] Diltiazem (Cardizem) [] Zolmitriptan (Zomig) []   Valproic Acid (Depakote) (Divalproex Sodium) [] Lisinopril (Prinivil, Zestril) [] Combo Abortives    Zonisamide (Zonegran) [] Metoprolol (Toprol) [] BC Powder    Muscle relaxants  Nadolol (Corgard) [] Butalbital and Acetaminophen (Bupap) []   Methocarbamol (Robaxin) [] Nebivolol (Bystolic) [] Butalbital, Acetaminophen, and caffeine (Fioricet) []   Baclofen (Lioresal) [] Nicardipine (Cardene) []     Cyclobenzaprine (Flexeril) [] Nimodipine (Nimotop) [] Butalbital, Aspirin, and caffeine (Fiorinal) []   Tizanidine (Zanaflex) [] Propranolol (Inderal) []     Benzodiazepines  Telmisartan (Micardis) [] Butalbital, Caffeine, Acetaminophen, and Codeine (Fioricet with Codeine) []   Clonazepam (Klonopin) [] Timolol (Blocadren) []     Alprazolam (Xanax) [] Verapamil (Calan, Verelan) [] Butalbital, Caffeine, Aspirin, and Codeine  (Fiorinal with Codeine) []   Diazepam (Valium) [] NSAIDs      Lorazepam (Ativan) [] Acetaminophen (Tylenol) [x]     Antidepressants   Acetylsalicylic Acid (Aspirin) [] Aspirin, Caffeine, and Acetaminophen (Excedrin) (Goodys) []   Amitriptyline (Elavil) [] Celecoxib (Celebrex, ElYXYB) []     Bupropion (Wellbutrin) [] Diclofenac (Cambia) []     Citalopram (Celexa) [] Ibuprofen (Motrin, Advil) [] Acetaminophen, Caffeine, Pyrilamine maleate (Midol) []   Desipramine (Norpramin) [] Indomethacin (Indocin) []     Desvenlafazine (Pristiq) [] Ketoprofen (Orudis) [] Acetaminophen, Dichloralphenazone, and Isometheptene (Midrin) []   Doxepin (Sinequan) [] Ketorolac  (Toradol, SPRIX) []     Duloxetine (Cymbalta) [] Naproxen (Anaprox, Aleve) []     Escitalopram (Lexapro) [] Meclofenamic Acid (Meclomen) [] Procedures    Fluoxetine (Prozac) [] Meloxicam (Mobic) [] Greater occipital nerve block []   Imipramine (Tofranil) [] Monoclonals  Cervical radiofrequency ablation []   Nortriptyline (Pamelor) [] Erenumab-aooe (Aimovig) [] Spenopalatine ganglion block []   Protriptyline (Vivactil) [] Galcanezumab (Emgality) [] Occipital neuro stimulation []   Trazodone (Desyrel, Oleptro) [] Fremanazumab-vfrm (Ajovy) [] Cervical Epidural steroid injection []   Venlafaxine (Effexor) [] Eptinezumab (Vyepti) [] Facet joint injections []   Oral CGRP inhibitors  Neuromodulation devices   Transforaminal epidural steroid injection []   Atogepant (Qulipta) [] Cefaly [] Cervical medial branch blocks []   Rimegepant (Nurtec) [] Gamma Core [] Botox []   Ubrogepant (Ubrelvy) [] Nerivio [] iovera []   Zavegepant (Zavzpret) [] Transcranial Magnetic stimulation [] Antiemetics    Other    Prochlorperazine (Compazine) []   Memantine (Namenda) []   Metoclopramide (Reglan)  []   Magnesium Oxide []   Promethazine (Phenergan)  []   Riboflavin (B2) []   Ondansetron (Zofran) []   Co Enzyme Q10 []   Meclizine (Antivert, Dramamine) []     Review of Systems:  Review of Systems   HENT:  Negative for congestion, sinus pain and tinnitus.    Eyes:  Positive for photophobia. Negative for blurred vision and double vision.   Gastrointestinal:  Positive for nausea. Negative for vomiting.   Musculoskeletal:  Negative for falls and neck pain.   Neurological:  Positive for dizziness, sensory change, speech change and headaches. Negative for focal weakness.   Psychiatric/Behavioral:  The patient does not have insomnia.    All other systems reviewed and are negative.       Past Medical History:  -------------------------------------    Allergy    Anemia    Asthma    Depression    Diabetes mellitus    Diverticulosis    GERD  (gastroesophageal reflux disease)    Headache disorder    pt states due to cervical spondylosis    History of positive PPD    Hypertension    Intestinal metaplasia of gastric mucosa    Obesity    Spondylosis of cervical region without myelopathy or radiculopathy    TIA (transient ischemic attack)    questionable history of    Vitamin D deficiency       Allergies:  Review of patient's allergies indicates:   Allergen Reactions    Iodine and iodide containing products Other (See Comments)     Pt had nausea, vomiting and seizure like activity.    Latex, natural rubber Rash    Penicillins Shortness Of Breath    Tomato Nausea And Vomiting    Tomato (solanum lycopersicum) Nausea And Vomiting    Vicodin [hydrocodone-acetaminophen] Nausea And Vomiting    Bananas [banana] Rash    Mcneil Nausea Only     magnolias       Pertinent Family History:  Family History   Problem Relation Name Age of Onset    Arthritis Mother 91     Allergies Father 95     Hypertension Father 95     Asthma Sister 4     Arthritis Sister 4     Breast cancer Paternal Aunt      Diabetes Maternal Grandmother      Diabetes Maternal Grandfather      Diabetes Paternal Grandmother      Diabetes Paternal Grandfather      No Known Problems Daughter      No Known Problems Son      Colon cancer Neg Hx      Ovarian cancer Neg Hx      Esophageal cancer Neg Hx      Eczema Neg Hx         Pertinent Social History:  Social History[1]    Medications:  Current Outpatient Medications   Medication Instructions    acetaminophen (TYLENOL) 500 MG tablet Take 2 tablets by mouth every 6 (six) hours as needed for Pain for up to 10 days    albuterol (PROVENTIL/VENTOLIN HFA) 90 mcg/actuation inhaler 2 puffs, Inhalation, Every 6 hours PRN, Rescue    amlodipine-valsartan (EXFORGE)  mg per tablet Take 1 tablet by mouth daily    amlodipine-valsartan (EXFORGE)  mg per tablet Take 1 tablet by mouth daily    azelastine (ASTELIN) 137 mcg (0.1 %) nasal spray Use 2 sprays (274 mcg  total) by Nasal route 2 (two) times daily.    chlorhexidine (PERIDEX) 0.12 % solution RINSE MOUTH WITH 15ML (1 CAPFUL) FOR 30 SECONDS MORNING AND EVENING AFTER TOOTHBRUSHING. EXPECTORATE AFTER RINSING, DO NOT SWALLOW    cycloSPORINE (RESTASIS) 0.05 % ophthalmic emulsion 1 drop, Both Eyes, 2 times daily    fluticasone propionate (FLONASE) 100 mcg, Each Nostril, Daily    hydroCHLOROthiazide (HYDRODIURIL) 12.5 MG Tab Take 1 tablet by mouth daily    hydroCHLOROthiazide 12.5 MG Tab Take 1 tablet by mouth daily    hydrOXYzine HCL (ATARAX) 25 MG tablet Take 1 tablet by mouth every 6 hours as needed for itching    ibuprofen (ADVIL,MOTRIN) 600 MG tablet TAKE 1 TABLET BY MOUTH 4 TIMES DAILY AS NEEDED.    ibuprofen (ADVIL,MOTRIN) 600 mg, Oral, Every 8 hours PRN    meloxicam (MOBIC) 15 MG tablet Take 1 tablet by mouth daily    montelukast (SINGULAIR) 10 mg tablet Take 1 tablet by mouth nightly    ondansetron (ZOFRAN-ODT) 4 mg    OZEMPIC 0.25 mg or 0.5 mg (2 mg/3 mL) pen injector Inject 0.25 mg into the skin once a week    predniSONE (DELTASONE) 10 mg, Oral, Daily    sertraline (ZOLOFT) 50 MG tablet Take 1 tablet by mouth nightly        Objective:     Vitals:    05/21/25 0803   BP: 129/84   Pulse: 74        General:  She is currently starting to get a headache, in discomfort by light, squints, and manifest bradyphrenia and confusion in answering questions   MSK: TTP on frontal area    Neurologic Exam:   Awake, alert and oriented x3  Speech spontaneous and fluent, intact comprehension.   Adequate fund of knowledge, vocabulary. Can spell WORLD backwards.     CN II - CN XII:  PERRLA. EOM intact. No Nystagmus. No ophthalmoplegia.   Facial sensation is diminished on the right V2 and V3.  Facial expression is full and symmetric.   Hearing is intact bilaterally.   Palate elevates symmetrically.   SCM and Trapezius full strength bilaterally.   Tongue is midline.     Motor:  Normal bulk and tone in all four limbs.   There are no atrophy  or fasciculations. No tremor.  Motor strength is symmetric and almost 5/5 throughout (give away weakness)    Sensory:  Light touch: reduced tactile sense on RUE and on RLE  Proprioception: proprioceptive sense present on RUE and on LUE  Romberg is Exam: positive    DTRs:   Biceps Brachioradialis Triceps Josie Patellar Ankle Plantar   Right 2+ 2+  - 2+     Left 2+ 2+  - 2+       Coordination:  Finger to nose is normal bilaterally.  Slowed fine finger movements and rapid alternating movements.    Gait:  [place] on casual gait.    Pertinent lab results  Lab Results   Component Value Date    FOLATE 15.0 02/06/2009    [id number] 1,097 (H) 05/03/2018    THIAMINEBLOO 60 06/27/2014    [id number] 37 09/28/2021     Lab Results   Component Value Date    METHLYMALONI 0.35 05/02/2014     Lab Results   Component Value Date    PATHINTPSPE REVIEWED 01/22/2021     Lab Results   Component Value Date    ANASCREEN Negative <1:160 05/02/2014    SEDRATE 102 (H) 01/22/2021     Lab Results   Component Value Date    KPR24RTUJ Non-reactive 09/17/2024    HEPBSAG Negative 05/03/2018    TBGOLDPLUS Negative 09/10/2019     Lab Results   Component Value Date    TSH 1.16 04/26/2024    FREET4 1.01 02/06/2009    WBC 9.80 09/17/2024    LYMPH 2.7 09/17/2024    LYMPH 27.6 09/17/2024    RBC 5.81 (H) 09/17/2024    HGB 12.4 09/17/2024    HCT 43.2 09/17/2024    MCV 74 (L) 09/17/2024     09/17/2024     09/17/2024    K 3.9 09/17/2024    CO2 29 09/17/2024    BUN 20 09/17/2024    CREATININE 0.8 09/17/2024    CALCIUM 9.5 09/17/2024    AST 16 09/17/2024    ALT 16 09/17/2024       Pertinent imaging results    *Images personally reviewed and interpreted:  9/29/2022  CT Head wo contrast:  No acute intracranial pathologies  Partially empty sella    Other pertinent studies  None    Assessment:   Meka Medina is a 64 y.o. RH female with Hx of TIA who presents for evaluation of worsening headaches. Features are suggestive of  migraines with basilar  aura. Considering the onset  after the age of 52 and worsening in intensity along with focal neurological deficits on exam, I'd highly recommend imaging of the brain to exclude intracranial pathologies. For rescue, we will do a trial of Ubrelvy, since triptans are contraindicated with her history of TIA and basilar migraines.  She can take daily magnesium oxide to reduce the frequency and intensity of the headaches.    1. Intractable migraine without aura and without status migrainosus    2. Right sided numbness    3. Worsening headaches    4. Basilar migraine      Plan:     - Ambulatory referral/consult to Neurology  - ubrogepant (UBRELVY) 100 mg tablet; Take one tablet at the onset of migraine, if symptoms persist or return, may repeat dose after =2 hours. Maximum daily dose: 200 mg.  Dispense: 10 tablet; Refill: 11  - magnesium oxide (MAG-OX) 400 mg (241.3 mg magnesium) tablet; Take 1 tablet (400 mg total) by mouth once daily.  Dispense: 60 tablet; Refill: 5  - Follow up: RTC in 3 months to reassess      Disclaimer: This note was partly generated using dictation software which may occasionally result in transcription errors that are missed on review.      Based on our encounter today, my overall Medical Decision Making is a Level 4     Complexity of Problem: Moderate (1 or more chronic illnesses with exacerbation, progression or treatment side effects)  Complexity of Data: Extensive (Review of >3 unique test results, Ordering each unique test, and Independent interpretation of tests)  Risk of Complications and/or morbidity/mortality of Management: Moderate risk (Prescription drug management)        Marie Bal MD    [facility]  05/21/2025          [1]   Social History  Tobacco Use    Smoking status: Never     Passive exposure: Never    Smokeless tobacco: Never    Tobacco comments:     smoked up to 2 packs for a year or so; eaten marijuana in the food periodically in the past last was 2009;  assistant in childcare; ;    Substance Use Topics    Alcohol use: Yes     Comment: maybe one yearly    Drug use: No